# Patient Record
Sex: FEMALE | Race: WHITE | NOT HISPANIC OR LATINO | Employment: OTHER | URBAN - METROPOLITAN AREA
[De-identification: names, ages, dates, MRNs, and addresses within clinical notes are randomized per-mention and may not be internally consistent; named-entity substitution may affect disease eponyms.]

---

## 2017-02-17 ENCOUNTER — COMMUNICATION - HEALTHEAST (OUTPATIENT)
Dept: FAMILY MEDICINE | Facility: CLINIC | Age: 57
End: 2017-02-17

## 2017-02-17 DIAGNOSIS — K21.9 ESOPHAGEAL REFLUX: ICD-10-CM

## 2017-02-19 ENCOUNTER — COMMUNICATION - HEALTHEAST (OUTPATIENT)
Dept: FAMILY MEDICINE | Facility: CLINIC | Age: 57
End: 2017-02-19

## 2017-02-19 DIAGNOSIS — K21.9 ESOPHAGEAL REFLUX: ICD-10-CM

## 2017-03-21 ENCOUNTER — COMMUNICATION - HEALTHEAST (OUTPATIENT)
Dept: FAMILY MEDICINE | Facility: CLINIC | Age: 57
End: 2017-03-21

## 2017-03-22 ENCOUNTER — RECORDS - HEALTHEAST (OUTPATIENT)
Dept: ADMINISTRATIVE | Facility: OTHER | Age: 57
End: 2017-03-22

## 2017-05-20 ENCOUNTER — COMMUNICATION - HEALTHEAST (OUTPATIENT)
Dept: FAMILY MEDICINE | Facility: CLINIC | Age: 57
End: 2017-05-20

## 2017-05-20 DIAGNOSIS — K21.9 ESOPHAGEAL REFLUX: ICD-10-CM

## 2017-08-09 ENCOUNTER — RECORDS - HEALTHEAST (OUTPATIENT)
Dept: ADMINISTRATIVE | Facility: OTHER | Age: 57
End: 2017-08-09

## 2017-08-28 ENCOUNTER — COMMUNICATION - HEALTHEAST (OUTPATIENT)
Dept: FAMILY MEDICINE | Facility: CLINIC | Age: 57
End: 2017-08-28

## 2017-09-12 ENCOUNTER — HOSPITAL ENCOUNTER (OUTPATIENT)
Dept: ULTRASOUND IMAGING | Facility: HOSPITAL | Age: 57
Discharge: HOME | End: 2017-09-12

## 2017-09-12 DIAGNOSIS — E04.1 THYROID NODULE: ICD-10-CM

## 2017-10-24 ENCOUNTER — RECORDS - HEALTHEAST (OUTPATIENT)
Dept: ADMINISTRATIVE | Facility: OTHER | Age: 57
End: 2017-10-24

## 2018-01-10 ENCOUNTER — HOSPITAL ENCOUNTER (OUTPATIENT)
Dept: MAMMOGRAPHY | Facility: HOSPITAL | Age: 58
Discharge: HOME | End: 2018-01-10
Attending: OBSTETRICS & GYNECOLOGY

## 2018-01-10 DIAGNOSIS — Z12.31 VISIT FOR SCREENING MAMMOGRAM: ICD-10-CM

## 2018-03-11 ENCOUNTER — COMMUNICATION - HEALTHEAST (OUTPATIENT)
Dept: FAMILY MEDICINE | Facility: CLINIC | Age: 58
End: 2018-03-11

## 2018-03-11 DIAGNOSIS — K21.9 ESOPHAGEAL REFLUX: ICD-10-CM

## 2018-04-23 ENCOUNTER — RECORDS - HEALTHEAST (OUTPATIENT)
Dept: ADMINISTRATIVE | Facility: OTHER | Age: 58
End: 2018-04-23

## 2018-06-25 ENCOUNTER — COMMUNICATION - HEALTHEAST (OUTPATIENT)
Dept: FAMILY MEDICINE | Facility: CLINIC | Age: 58
End: 2018-06-25

## 2018-06-25 DIAGNOSIS — K21.9 ESOPHAGEAL REFLUX: ICD-10-CM

## 2018-08-21 ENCOUNTER — OFFICE VISIT - HEALTHEAST (OUTPATIENT)
Dept: FAMILY MEDICINE | Facility: CLINIC | Age: 58
End: 2018-08-21

## 2018-08-21 ENCOUNTER — RECORDS - HEALTHEAST (OUTPATIENT)
Dept: GENERAL RADIOLOGY | Facility: CLINIC | Age: 58
End: 2018-08-21
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
## 2018-08-21 DIAGNOSIS — R10.12 LUQ ABDOMINAL PAIN: ICD-10-CM

## 2018-08-21 DIAGNOSIS — Z00.00 HEALTH CARE MAINTENANCE: ICD-10-CM

## 2018-08-21 DIAGNOSIS — R06.83 SNORING: ICD-10-CM

## 2018-08-21 DIAGNOSIS — K21.9 GASTROESOPHAGEAL REFLUX DISEASE WITHOUT ESOPHAGITIS: ICD-10-CM

## 2018-08-21 ASSESSMENT — MIFFLIN-ST. JEOR: SCORE: 1356.76

## 2018-08-27 ENCOUNTER — AMBULATORY - HEALTHEAST (OUTPATIENT)
Dept: LAB | Facility: CLINIC | Age: 58
End: 2018-08-27

## 2018-08-27 DIAGNOSIS — Z00.00 HEALTH CARE MAINTENANCE: ICD-10-CM

## 2018-08-27 LAB
ALBUMIN SERPL-MCNC: 3.6 G/DL (ref 3.5–5)
ALP SERPL-CCNC: 68 U/L (ref 45–120)
ALT SERPL W P-5'-P-CCNC: 56 U/L (ref 0–45)
ALT SERPL-CCNC: 56 U/L (ref 0–45)
ANION GAP SERPL CALCULATED.3IONS-SCNC: 8 MMOL/L (ref 5–18)
AST SERPL W P-5'-P-CCNC: 50 U/L (ref 0–40)
AST SERPL-CCNC: 50 U/L (ref 0–40)
BILIRUB SERPL-MCNC: 0.6 MG/DL (ref 0–1)
BUN SERPL-MCNC: 13 MG/DL (ref 8–22)
C REACTIVE PROTEIN LHE: 0.7 MG/DL (ref 0–0.8)
CALCIUM SERPL-MCNC: 9.2 MG/DL (ref 8.5–10.5)
CHLORIDE BLD-SCNC: 105 MMOL/L (ref 98–107)
CHOLEST SERPL-MCNC: 250 MG/DL
CHOLEST SERPL-MCNC: 250 MG/DL
CO2 SERPL-SCNC: 28 MMOL/L (ref 22–31)
CREAT SERPL-MCNC: 0.73 MG/DL (ref 0.6–1.1)
CREAT SERPL-MCNC: 0.73 MG/DL (ref 0.6–1.1)
ERYTHROCYTE [DISTWIDTH] IN BLOOD BY AUTOMATED COUNT: 12 % (ref 11–14.5)
FASTING STATUS PATIENT QL REPORTED: YES
GFR SERPL CREATININE-BSD FRML MDRD: >60 ML/MIN/1.73M2
GFR SERPL CREATININE-BSD FRML MDRD: >60 ML/MIN/1.73M2
GLUCOSE BLD-MCNC: 103 MG/DL (ref 70–125)
GLUCOSE SERPL-MCNC: 103 MG/DL (ref 70–125)
HCT VFR BLD AUTO: 38.1 % (ref 35–47)
HDLC SERPL-MCNC: 44 MG/DL
HDLC SERPL-MCNC: 44 MG/DL
HGB BLD-MCNC: 13.1 G/DL (ref 12–16)
LDLC SERPL CALC-MCNC: 175 MG/DL
LDLC SERPL CALC-MCNC: 175 MG/DL
MCH RBC QN AUTO: 30.8 PG (ref 27–34)
MCHC RBC AUTO-ENTMCNC: 34.5 G/DL (ref 32–36)
MCV RBC AUTO: 89 FL (ref 80–100)
PLATELET # BLD AUTO: 184 THOU/UL (ref 140–440)
PMV BLD AUTO: 6.4 FL (ref 7–10)
POTASSIUM BLD-SCNC: 4.5 MMOL/L (ref 3.5–5)
POTASSIUM SERPL-SCNC: 4.5 MMOL/L (ref 3.5–5)
PROT SERPL-MCNC: 7.1 G/DL (ref 6–8)
RBC # BLD AUTO: 4.27 MILL/UL (ref 3.8–5.4)
SODIUM SERPL-SCNC: 141 MMOL/L (ref 136–145)
TRIGL SERPL-MCNC: 157 MG/DL
TRIGL SERPL-MCNC: 157 MG/DL
WBC: 5.6 THOU/UL (ref 4–11)

## 2018-10-09 ENCOUNTER — RECORDS - HEALTHEAST (OUTPATIENT)
Dept: ADMINISTRATIVE | Facility: OTHER | Age: 58
End: 2018-10-09

## 2018-10-25 ENCOUNTER — TRANSFERRED RECORDS (OUTPATIENT)
Dept: HEALTH INFORMATION MANAGEMENT | Facility: CLINIC | Age: 58
End: 2018-10-25

## 2018-10-25 ENCOUNTER — AMBULATORY - HEALTHEAST (OUTPATIENT)
Dept: SLEEP MEDICINE | Facility: CLINIC | Age: 58
End: 2018-10-25

## 2018-10-25 ENCOUNTER — OFFICE VISIT - HEALTHEAST (OUTPATIENT)
Dept: SLEEP MEDICINE | Facility: CLINIC | Age: 58
End: 2018-10-25

## 2018-10-25 DIAGNOSIS — G47.00 PERSISTENT INSOMNIA: ICD-10-CM

## 2018-10-25 DIAGNOSIS — Z91.89 AT RISK FOR SLEEP APNEA: ICD-10-CM

## 2018-10-25 DIAGNOSIS — G47.10 EXCESSIVE SLEEPINESS: ICD-10-CM

## 2018-10-25 DIAGNOSIS — R06.83 SNORING: ICD-10-CM

## 2018-10-25 ASSESSMENT — MIFFLIN-ST. JEOR: SCORE: 1359.77

## 2018-11-06 ENCOUNTER — RECORDS - HEALTHEAST (OUTPATIENT)
Dept: ADMINISTRATIVE | Facility: OTHER | Age: 58
End: 2018-11-06

## 2018-11-19 ENCOUNTER — TELEPHONE (OUTPATIENT)
Dept: SLEEP MEDICINE | Facility: CLINIC | Age: 58
End: 2018-11-19

## 2018-11-19 ENCOUNTER — THERAPY VISIT (OUTPATIENT)
Dept: SLEEP MEDICINE | Facility: CLINIC | Age: 58
End: 2018-11-19
Payer: COMMERCIAL

## 2018-11-19 ENCOUNTER — RECORDS - HEALTHEAST (OUTPATIENT)
Dept: ADMINISTRATIVE | Facility: OTHER | Age: 58
End: 2018-11-19

## 2018-11-19 DIAGNOSIS — R06.83 SNORING: Primary | ICD-10-CM

## 2018-11-19 DIAGNOSIS — R06.81 APNEA: ICD-10-CM

## 2018-11-19 DIAGNOSIS — R06.83 SNORING: ICD-10-CM

## 2018-11-19 PROCEDURE — 95810 POLYSOM 6/> YRS 4/> PARAM: CPT | Performed by: FAMILY MEDICINE

## 2018-11-19 NOTE — MR AVS SNAPSHOT
"              After Visit Summary   2018    Pretty Vigil    MRN: [id number]           Patient Information     Date Of Birth          1960        Visit Information        Provider Department      2018 8:00 PM SLEEP LAB, BED TWO [facility]        Today's Diagnoses     Snoring        Apnea           Follow-ups after your visit        Who to contact     If you have questions or need follow up information about today's clinic visit or your schedule please contact [facility] directly at [phone].  Normal or non-critical lab and imaging results will be communicated to you by MyChart, letter or phone within 4 business days after the clinic has received the results. If you do not hear from us within 7 days, please contact the clinic through Picodeonhart or phone. If you have a critical or abnormal lab result, we will notify you by phone as soon as possible.  Submit refill requests through Spice[employer]M or call your pharmacy and they will forward the refill request to us. Please allow 3 business days for your refill to be completed.          Additional Information About Your Visit        MyChart Information     SpiceCSM lets you send messages to your doctor, view your test results, renew your prescriptions, schedule appointments and more. To sign up, go to [email or web address] . Click on \"Log in\" on the left side of the screen, which will take you to the Welcome page. Then click on \"Sign up Now\" on the right side of the page.     You will be asked to enter the access code listed below, as well as some personal information. Please follow the directions to create your username and password.     Your access code is: [email or web address]  Expires: 2019  5:13 AM     Your access code will  in 90 days. If you need help or a new code, please call your [facility] or [phone].        Care EveryWhere ID     This is your Care EveryWhere ID. This could be used by other " organizations to access your [place] medical records  [id number]         Blood Pressure from Last 3 Encounters:   No data found for BP    Weight from Last 3 Encounters:   No data found for Wt              We Performed the Following     Comprehensive Sleep Study        Primary Care Provider Office Phone # Fax #    Wendi Murillo [phone]       [facility] [address] [facility] [address]        Equal Access to Services     REYMUNDO LI : Hadii aad ku hadasho Soomaali, waaxda luqadaha, qaybta kaalmada adeegyada, waxay idiin hayaan adeeg kharash laalicia . So [facility] [phone].    ATENCIÓN: Si habla español, tiene a alvarez disposición servicios gratuitos de asistencia lingüística. Llame al [phone].    We comply with applicable federal civil rights laws and [place] laws. We do not discriminate on the basis of race, color, national origin, age, disability, sex, sexual orientation, or gender identity.            Thank you!     Thank you for choosing [facility]  for your care. Our goal is always to provide you with excellent care. Hearing back from our patients is one way we can continue to improve our services. Please take a few minutes to complete the written survey that you may receive in the mail after your visit with us. Thank you!             Your Updated Medication List - Protect others around you: Learn how to safely use, store and throw away your medicines at www.disposemymeds.org.      Notice  As of 11/19/2018 11:59 PM    You have not been prescribed any medications.

## 2018-11-19 NOTE — TELEPHONE ENCOUNTER
Routine split study ordered by Dr. Alves via fax. Can you sign an order in EPIC so the techs have something to release and link to our records. Thank you.

## 2018-11-26 LAB — SLPCOMP: NORMAL

## 2018-12-01 ENCOUNTER — HEALTH MAINTENANCE LETTER (OUTPATIENT)
Age: 58
End: 2018-12-01

## 2018-12-07 ENCOUNTER — OFFICE VISIT - HEALTHEAST (OUTPATIENT)
Dept: SLEEP MEDICINE | Facility: CLINIC | Age: 58
End: 2018-12-07

## 2018-12-07 DIAGNOSIS — R06.83 SNORING: ICD-10-CM

## 2018-12-07 DIAGNOSIS — G47.00 PERSISTENT INSOMNIA: ICD-10-CM

## 2018-12-07 ASSESSMENT — MIFFLIN-ST. JEOR: SCORE: 1420.56

## 2019-02-07 ENCOUNTER — COMMUNICATION - HEALTHEAST (OUTPATIENT)
Dept: FAMILY MEDICINE | Facility: CLINIC | Age: 59
End: 2019-02-07

## 2019-02-07 DIAGNOSIS — K21.9 ESOPHAGEAL REFLUX: ICD-10-CM

## 2019-03-05 ENCOUNTER — COMMUNICATION - HEALTHEAST (OUTPATIENT)
Dept: FAMILY MEDICINE | Facility: CLINIC | Age: 59
End: 2019-03-05

## 2019-03-05 DIAGNOSIS — K21.9 ESOPHAGEAL REFLUX: ICD-10-CM

## 2019-03-06 ENCOUNTER — HOSPITAL ENCOUNTER (OUTPATIENT)
Dept: MAMMOGRAPHY | Facility: CLINIC | Age: 59
Discharge: HOME | End: 2019-03-06
Attending: FAMILY MEDICINE

## 2019-03-06 DIAGNOSIS — Z12.31 VISIT FOR SCREENING MAMMOGRAM: ICD-10-CM

## 2019-08-07 ENCOUNTER — COMMUNICATION - HEALTHEAST (OUTPATIENT)
Dept: FAMILY MEDICINE | Facility: CLINIC | Age: 59
End: 2019-08-07

## 2019-08-07 ENCOUNTER — OFFICE VISIT - HEALTHEAST (OUTPATIENT)
Dept: FAMILY MEDICINE | Facility: CLINIC | Age: 59
End: 2019-08-07

## 2019-08-07 DIAGNOSIS — K21.9 ESOPHAGEAL REFLUX: ICD-10-CM

## 2019-08-07 DIAGNOSIS — Z00.00 HEALTH CARE MAINTENANCE: ICD-10-CM

## 2019-08-07 DIAGNOSIS — R00.2 PALPITATIONS: ICD-10-CM

## 2019-08-07 DIAGNOSIS — L30.9 DERMATITIS: ICD-10-CM

## 2019-08-07 RX ORDER — ZOLEDRONIC ACID 5 MG/100ML
5 INJECTION, SOLUTION INTRAVENOUS
Status: SHIPPED | COMMUNITY
Start: 2019-08-07 | End: 2021-12-06

## 2019-08-09 ENCOUNTER — AMBULATORY - HEALTHEAST (OUTPATIENT)
Dept: LAB | Facility: CLINIC | Age: 59
End: 2019-08-09

## 2019-08-09 DIAGNOSIS — Z00.00 HEALTH CARE MAINTENANCE: ICD-10-CM

## 2019-08-09 LAB
ALBUMIN SERPL-MCNC: 4 G/DL (ref 3.5–5)
ALP SERPL-CCNC: 85 U/L (ref 45–120)
ALT SERPL W P-5'-P-CCNC: 41 U/L (ref 0–45)
ANION GAP SERPL CALCULATED.3IONS-SCNC: 10 MMOL/L (ref 5–18)
AST SERPL W P-5'-P-CCNC: 37 U/L (ref 0–40)
BILIRUB SERPL-MCNC: 0.5 MG/DL (ref 0–1)
BUN SERPL-MCNC: 15 MG/DL (ref 8–22)
CALCIUM SERPL-MCNC: 10 MG/DL (ref 8.5–10.5)
CHLORIDE BLD-SCNC: 102 MMOL/L (ref 98–107)
CHOLEST SERPL-MCNC: 270 MG/DL
CO2 SERPL-SCNC: 27 MMOL/L (ref 22–31)
CREAT SERPL-MCNC: 0.81 MG/DL (ref 0.6–1.1)
ERYTHROCYTE [DISTWIDTH] IN BLOOD BY AUTOMATED COUNT: 12.1 % (ref 11–14.5)
FASTING STATUS PATIENT QL REPORTED: YES
GFR SERPL CREATININE-BSD FRML MDRD: >60 ML/MIN/1.73M2
GLUCOSE BLD-MCNC: 98 MG/DL (ref 70–125)
HCT VFR BLD AUTO: 40.5 % (ref 35–47)
HDLC SERPL-MCNC: 57 MG/DL
HGB BLD-MCNC: 13.6 G/DL (ref 12–16)
LDLC SERPL CALC-MCNC: 188 MG/DL
MCH RBC QN AUTO: 30.9 PG (ref 27–34)
MCHC RBC AUTO-ENTMCNC: 33.5 G/DL (ref 32–36)
MCV RBC AUTO: 92 FL (ref 80–100)
PLATELET # BLD AUTO: 232 THOU/UL (ref 140–440)
PMV BLD AUTO: 7 FL (ref 7–10)
POTASSIUM BLD-SCNC: 4.2 MMOL/L (ref 3.5–5)
PROT SERPL-MCNC: 7.3 G/DL (ref 6–8)
RBC # BLD AUTO: 4.4 MILL/UL (ref 3.8–5.4)
SODIUM SERPL-SCNC: 139 MMOL/L (ref 136–145)
TRIGL SERPL-MCNC: 124 MG/DL
WBC: 6.5 THOU/UL (ref 4–11)

## 2019-08-13 ENCOUNTER — COMMUNICATION - HEALTHEAST (OUTPATIENT)
Dept: FAMILY MEDICINE | Facility: CLINIC | Age: 59
End: 2019-08-13

## 2019-08-13 DIAGNOSIS — K21.9 ESOPHAGEAL REFLUX: ICD-10-CM

## 2019-08-15 ENCOUNTER — AMBULATORY - HEALTHEAST (OUTPATIENT)
Dept: NURSING | Facility: CLINIC | Age: 59
End: 2019-08-15

## 2019-09-06 ENCOUNTER — COMMUNICATION - HEALTHEAST (OUTPATIENT)
Dept: FAMILY MEDICINE | Facility: CLINIC | Age: 59
End: 2019-09-06

## 2019-09-06 DIAGNOSIS — K21.9 ESOPHAGEAL REFLUX: ICD-10-CM

## 2019-09-25 ENCOUNTER — AMBULATORY - HEALTHEAST (OUTPATIENT)
Dept: SCHEDULING | Facility: CLINIC | Age: 59
End: 2019-09-25

## 2019-09-25 ENCOUNTER — RECORDS - HEALTHEAST (OUTPATIENT)
Dept: ADMINISTRATIVE | Facility: OTHER | Age: 59
End: 2019-09-25

## 2019-09-25 DIAGNOSIS — M81.0 OSTEOPOROSIS, POSTMENOPAUSAL: ICD-10-CM

## 2019-09-29 ENCOUNTER — HEALTH MAINTENANCE LETTER (OUTPATIENT)
Age: 59
End: 2019-09-29

## 2019-10-03 ENCOUNTER — AMBULATORY - HEALTHEAST (OUTPATIENT)
Dept: FAMILY MEDICINE | Facility: CLINIC | Age: 59
End: 2019-10-03

## 2019-10-03 ENCOUNTER — COMMUNICATION - HEALTHEAST (OUTPATIENT)
Dept: FAMILY MEDICINE | Facility: CLINIC | Age: 59
End: 2019-10-03

## 2019-10-03 DIAGNOSIS — K21.9 GASTROESOPHAGEAL REFLUX DISEASE, ESOPHAGITIS PRESENCE NOT SPECIFIED: ICD-10-CM

## 2019-10-07 ENCOUNTER — COMMUNICATION - HEALTHEAST (OUTPATIENT)
Dept: FAMILY MEDICINE | Facility: CLINIC | Age: 59
End: 2019-10-07

## 2019-10-07 ENCOUNTER — AMBULATORY - HEALTHEAST (OUTPATIENT)
Dept: FAMILY MEDICINE | Facility: CLINIC | Age: 59
End: 2019-10-07

## 2019-10-07 DIAGNOSIS — K21.9 ESOPHAGEAL REFLUX: ICD-10-CM

## 2019-10-12 ENCOUNTER — COMMUNICATION - HEALTHEAST (OUTPATIENT)
Dept: FAMILY MEDICINE | Facility: CLINIC | Age: 59
End: 2019-10-12

## 2019-10-12 DIAGNOSIS — L30.9 DERMATITIS: ICD-10-CM

## 2019-10-14 RX ORDER — TRIAMCINOLONE ACETONIDE 1 MG/G
CREAM TOPICAL
Qty: 15 G | Refills: 0 | Status: SHIPPED | OUTPATIENT
Start: 2019-10-14 | End: 2023-04-28

## 2019-10-29 ENCOUNTER — AMBULATORY - HEALTHEAST (OUTPATIENT)
Dept: NURSING | Facility: CLINIC | Age: 59
End: 2019-10-29

## 2019-11-07 ENCOUNTER — COMMUNICATION - HEALTHEAST (OUTPATIENT)
Dept: FAMILY MEDICINE | Facility: CLINIC | Age: 59
End: 2019-11-07

## 2019-11-07 ENCOUNTER — AMBULATORY - HEALTHEAST (OUTPATIENT)
Dept: FAMILY MEDICINE | Facility: CLINIC | Age: 59
End: 2019-11-07

## 2019-11-07 DIAGNOSIS — M85.80 OSTEOPENIA, UNSPECIFIED LOCATION: ICD-10-CM

## 2019-11-13 ENCOUNTER — AMBULATORY - HEALTHEAST (OUTPATIENT)
Dept: LAB | Facility: CLINIC | Age: 59
End: 2019-11-13

## 2019-11-13 DIAGNOSIS — M85.80 OSTEOPENIA, UNSPECIFIED LOCATION: ICD-10-CM

## 2019-11-14 ENCOUNTER — RECORDS - HEALTHEAST (OUTPATIENT)
Dept: ADMINISTRATIVE | Facility: OTHER | Age: 59
End: 2019-11-14

## 2019-11-14 LAB
25(OH)D3 SERPL-MCNC: 34 NG/ML (ref 30–80)
25(OH)D3 SERPL-MCNC: 34 NG/ML (ref 30–80)

## 2019-12-31 ENCOUNTER — COMMUNICATION - HEALTHEAST (OUTPATIENT)
Dept: FAMILY MEDICINE | Facility: CLINIC | Age: 59
End: 2019-12-31

## 2019-12-31 DIAGNOSIS — K21.9 GASTROESOPHAGEAL REFLUX DISEASE, ESOPHAGITIS PRESENCE NOT SPECIFIED: ICD-10-CM

## 2020-01-03 ENCOUNTER — AMBULATORY - HEALTHEAST (OUTPATIENT)
Dept: FAMILY MEDICINE | Facility: CLINIC | Age: 60
End: 2020-01-03

## 2020-01-03 DIAGNOSIS — K21.9 GASTROESOPHAGEAL REFLUX DISEASE, ESOPHAGITIS PRESENCE NOT SPECIFIED: ICD-10-CM

## 2020-02-14 ENCOUNTER — COMMUNICATION - HEALTHEAST (OUTPATIENT)
Dept: FAMILY MEDICINE | Facility: CLINIC | Age: 60
End: 2020-02-14

## 2020-02-14 DIAGNOSIS — K21.9 GASTROESOPHAGEAL REFLUX DISEASE, ESOPHAGITIS PRESENCE NOT SPECIFIED: ICD-10-CM

## 2020-06-03 ENCOUNTER — HOSPITAL ENCOUNTER (OUTPATIENT)
Dept: MAMMOGRAPHY | Facility: CLINIC | Age: 60
Discharge: HOME | End: 2020-06-03
Attending: FAMILY MEDICINE

## 2020-06-03 DIAGNOSIS — Z12.31 VISIT FOR SCREENING MAMMOGRAM: ICD-10-CM

## 2020-06-30 ENCOUNTER — COMMUNICATION - HEALTHEAST (OUTPATIENT)
Dept: FAMILY MEDICINE | Facility: CLINIC | Age: 60
End: 2020-06-30

## 2020-06-30 DIAGNOSIS — K21.9 GASTROESOPHAGEAL REFLUX DISEASE, ESOPHAGITIS PRESENCE NOT SPECIFIED: ICD-10-CM

## 2020-09-18 ENCOUNTER — COMMUNICATION - HEALTHEAST (OUTPATIENT)
Dept: FAMILY MEDICINE | Facility: CLINIC | Age: 60
End: 2020-09-18

## 2020-09-18 DIAGNOSIS — K21.9 GASTROESOPHAGEAL REFLUX DISEASE, ESOPHAGITIS PRESENCE NOT SPECIFIED: ICD-10-CM

## 2020-10-30 ENCOUNTER — COMMUNICATION - HEALTHEAST (OUTPATIENT)
Dept: FAMILY MEDICINE | Facility: CLINIC | Age: 60
End: 2020-10-30

## 2020-10-30 ENCOUNTER — RECORDS - HEALTHEAST (OUTPATIENT)
Dept: ENDOCRINOLOGY | Facility: CLINIC | Age: 60
End: 2020-10-30

## 2020-10-30 DIAGNOSIS — Z83.49 FAMILY HISTORY OF HEMOCHROMATOSIS: ICD-10-CM

## 2020-10-30 DIAGNOSIS — Z78.0 POSTMENOPAUSAL: ICD-10-CM

## 2020-10-30 DIAGNOSIS — M81.0 AGE-RELATED OSTEOPOROSIS WITHOUT CURRENT PATHOLOGICAL FRACTURE: ICD-10-CM

## 2020-11-02 ENCOUNTER — RECORDS - HEALTHEAST (OUTPATIENT)
Dept: ADMINISTRATIVE | Facility: OTHER | Age: 60
End: 2020-11-02

## 2020-11-03 ENCOUNTER — AMBULATORY - HEALTHEAST (OUTPATIENT)
Dept: LAB | Facility: CLINIC | Age: 60
End: 2020-11-03

## 2020-11-03 DIAGNOSIS — Z83.49 FAMILY HISTORY OF HEMOCHROMATOSIS: ICD-10-CM

## 2020-11-05 LAB — MOLECULAR DX INHERIT DISORDER - HISTORICAL: NORMAL

## 2020-12-23 ENCOUNTER — COMMUNICATION - HEALTHEAST (OUTPATIENT)
Dept: FAMILY MEDICINE | Facility: CLINIC | Age: 60
End: 2020-12-23

## 2020-12-23 DIAGNOSIS — K21.9 GASTROESOPHAGEAL REFLUX DISEASE: ICD-10-CM

## 2021-01-07 ENCOUNTER — OFFICE VISIT - HEALTHEAST (OUTPATIENT)
Dept: FAMILY MEDICINE | Facility: CLINIC | Age: 61
End: 2021-01-07

## 2021-01-07 DIAGNOSIS — K21.00 GASTROESOPHAGEAL REFLUX DISEASE WITH ESOPHAGITIS, UNSPECIFIED WHETHER HEMORRHAGE: ICD-10-CM

## 2021-01-07 DIAGNOSIS — R03.0 ELEVATED BLOOD PRESSURE READING WITHOUT DIAGNOSIS OF HYPERTENSION: ICD-10-CM

## 2021-01-07 DIAGNOSIS — Z00.00 ROUTINE HISTORY AND PHYSICAL EXAMINATION OF ADULT: ICD-10-CM

## 2021-01-07 LAB
ALBUMIN SERPL-MCNC: 4.2 G/DL (ref 3.5–5)
ALP SERPL-CCNC: 87 U/L (ref 45–120)
ALT SERPL W P-5'-P-CCNC: 31 U/L (ref 0–45)
ANION GAP SERPL CALCULATED.3IONS-SCNC: 13 MMOL/L (ref 5–18)
AST SERPL W P-5'-P-CCNC: 26 U/L (ref 0–40)
BILIRUB SERPL-MCNC: 0.4 MG/DL (ref 0–1)
BUN SERPL-MCNC: 17 MG/DL (ref 8–22)
CALCIUM SERPL-MCNC: 10.2 MG/DL (ref 8.5–10.5)
CHLORIDE BLD-SCNC: 102 MMOL/L (ref 98–107)
CHOLEST SERPL-MCNC: 289 MG/DL
CO2 SERPL-SCNC: 26 MMOL/L (ref 22–31)
CREAT SERPL-MCNC: 0.77 MG/DL (ref 0.6–1.1)
ERYTHROCYTE [DISTWIDTH] IN BLOOD BY AUTOMATED COUNT: 12.4 % (ref 11–14.5)
FASTING STATUS PATIENT QL REPORTED: YES
GFR SERPL CREATININE-BSD FRML MDRD: >60 ML/MIN/1.73M2
GLUCOSE BLD-MCNC: 94 MG/DL (ref 70–125)
HCT VFR BLD AUTO: 43.4 % (ref 35–47)
HDLC SERPL-MCNC: 63 MG/DL
HGB BLD-MCNC: 14.4 G/DL (ref 12–16)
LDLC SERPL CALC-MCNC: 204 MG/DL
MCH RBC QN AUTO: 30.4 PG (ref 27–34)
MCHC RBC AUTO-ENTMCNC: 33.1 G/DL (ref 32–36)
MCV RBC AUTO: 92 FL (ref 80–100)
PLATELET # BLD AUTO: 242 THOU/UL (ref 140–440)
PMV BLD AUTO: 6.9 FL (ref 7–10)
POTASSIUM BLD-SCNC: 4.2 MMOL/L (ref 3.5–5)
PROT SERPL-MCNC: 7.6 G/DL (ref 6–8)
RBC # BLD AUTO: 4.72 MILL/UL (ref 3.8–5.4)
SODIUM SERPL-SCNC: 141 MMOL/L (ref 136–145)
TRIGL SERPL-MCNC: 111 MG/DL
WBC: 6.8 THOU/UL (ref 4–11)

## 2021-01-07 ASSESSMENT — MIFFLIN-ST. JEOR: SCORE: 1368.51

## 2021-01-08 LAB
25(OH)D3 SERPL-MCNC: 39.1 NG/ML (ref 30–80)
25(OH)D3 SERPL-MCNC: 39.1 NG/ML (ref 30–80)

## 2021-01-13 ENCOUNTER — AMBULATORY - HEALTHEAST (OUTPATIENT)
Dept: NURSING | Facility: CLINIC | Age: 61
End: 2021-01-13

## 2021-01-13 DIAGNOSIS — I10 ESSENTIAL HYPERTENSION, BENIGN: ICD-10-CM

## 2021-01-15 ENCOUNTER — HEALTH MAINTENANCE LETTER (OUTPATIENT)
Age: 61
End: 2021-01-15

## 2021-02-08 ENCOUNTER — COMMUNICATION - HEALTHEAST (OUTPATIENT)
Dept: ADMINISTRATIVE | Facility: CLINIC | Age: 61
End: 2021-02-08

## 2021-02-09 ENCOUNTER — OFFICE VISIT - HEALTHEAST (OUTPATIENT)
Dept: FAMILY MEDICINE | Facility: CLINIC | Age: 61
End: 2021-02-09

## 2021-02-09 DIAGNOSIS — I10 ESSENTIAL HYPERTENSION, BENIGN: ICD-10-CM

## 2021-02-09 DIAGNOSIS — E78.2 MIXED HYPERLIPIDEMIA: ICD-10-CM

## 2021-03-15 ENCOUNTER — COMMUNICATION - HEALTHEAST (OUTPATIENT)
Dept: FAMILY MEDICINE | Facility: CLINIC | Age: 61
End: 2021-03-15

## 2021-03-15 DIAGNOSIS — K21.9 GASTROESOPHAGEAL REFLUX DISEASE: ICD-10-CM

## 2021-03-15 DIAGNOSIS — I10 ESSENTIAL HYPERTENSION, BENIGN: ICD-10-CM

## 2021-03-17 RX ORDER — LISINOPRIL 10 MG/1
TABLET ORAL
Qty: 90 TABLET | Refills: 3 | Status: SHIPPED | OUTPATIENT
Start: 2021-03-17 | End: 2022-03-02

## 2021-03-17 RX ORDER — MECOBALAMIN 5000 MCG
TABLET,DISINTEGRATING ORAL
Qty: 180 CAPSULE | Refills: 3 | Status: SHIPPED | OUTPATIENT
Start: 2021-03-17 | End: 2022-03-02

## 2021-05-21 ENCOUNTER — RECORDS - HEALTHEAST (OUTPATIENT)
Dept: ADMINISTRATIVE | Facility: OTHER | Age: 61
End: 2021-05-21

## 2021-05-25 ENCOUNTER — RECORDS - HEALTHEAST (OUTPATIENT)
Dept: ADMINISTRATIVE | Facility: CLINIC | Age: 61
End: 2021-05-25

## 2021-05-27 VITALS — SYSTOLIC BLOOD PRESSURE: 148 MMHG | DIASTOLIC BLOOD PRESSURE: 75 MMHG | HEART RATE: 91 BPM

## 2021-05-31 ENCOUNTER — RECORDS - HEALTHEAST (OUTPATIENT)
Dept: ADMINISTRATIVE | Facility: CLINIC | Age: 61
End: 2021-05-31

## 2021-05-31 NOTE — TELEPHONE ENCOUNTER
We received some vaccine in so patient scheduled a nurse only appt for 08/15/19 at 89:40AM. Please place order if appropriate.     Thank you!

## 2021-05-31 NOTE — TELEPHONE ENCOUNTER
Informed patient that we are currently experiencing a shortage on the shingles vaccine. We will put her on the wait list and call her when we have the vaccine available.

## 2021-05-31 NOTE — PROGRESS NOTES
ASSESSMENT/PLAN  1. Esophageal reflux  With taking the combination of these medications patient has her acid reflux under good control  If she misses dosing she usually has a flareup within a day  Patient will continue with current medication refill sent to the pharmacy  - lansoprazole (PREVACID) 15 MG capsule; TAKE ONE CAPSULE BY MOUTH ONCE DAILY BEFORE A MEAL  Dispense: 90 capsule; Refill: 1  - ranitidine (ZANTAC) 150 MG tablet; Take 1 tablet (150 mg total) by mouth at bedtime.  Dispense: 90 tablet; Refill: 1    2. Dermatitis  Patient periodically uses triamcinolone on areas of skin irritation  Refill sent to the pharmacy  - triamcinolone (KENALOG) 0.1 % cream; APPLY EXTERNALLY TO THE AFFECTED AREA TWICE DAILY AS NEEDED  Dispense: 15 g; Refill: 0    3. Health care maintenance  Patient is due for fasting labs but she is not fasting today  She will return for fasting labs in the near future  Patient has been working out on a more regular basis with a   She is working on weight loss we discussed lowering carbohydrate intake in the diet  - Comprehensive Metabolic Panel; Future  - Lipid [place] FASTING; Future  - HM2(CBC w/o Differential); Future    4. Palpitations  Patient has noted the last few months of periodically when walking she gets a slight palpitation which is only a second or 2 with and if this continues  It is no longer in duration and has no other associated symptoms  Discussed monitoring at this time        SUBJECTIVE:   Chief Complaint   Patient presents with     Gastroesophageal Reflux     Medication check      Immunizations     Discuss shingles vaccine      Shortness of Breath     At times has trouble catching her breath      Medication Management     Not fasting today      Pretty A Musa 58 y.o. female    Current Outpatient Medications   Medication Sig Dispense Refill     CHOLECALCIFEROL, VITAMIN D3, (VITAMIN D3 ORAL) Take 2,000 Units by mouth daily.              Lactobacillus rhamnosus GG  (CULTURELLE) 15 billion cell CpSP Take 1 capsule by mouth 3 (three) times a day with meals.       OMEGA-3/DHA/EPA/FISH OIL (FISH OIL-OMEGA-3 FATTY ACIDS) 300-1,000 mg capsule Take 2 g by mouth daily.       vitamin E 100 UNIT capsule Take 100 Units by mouth daily.       zoledronic acid-mannitol-water (RECLAST) 5 mg/100 mL PgBk Infuse 5 mg into a venous catheter. Once yearly       lansoprazole (PREVACID) 15 MG capsule TAKE ONE CAPSULE BY MOUTH ONCE DAILY BEFORE A MEAL 90 capsule 1     ranitidine (ZANTAC) 150 MG tablet Take 1 tablet (150 mg total) by mouth at bedtime. 90 tablet 1     triamcinolone (KENALOG) 0.1 % cream APPLY EXTERNALLY TO THE AFFECTED AREA TWICE DAILY AS NEEDED 15 g 0     Current Facility-Administered Medications   Medication Dose Route Frequency Provider Last Rate Last Dose     denosumab 60 mg (PROLIA 60 mg/ml)  60 mg Subcutaneous Q6 Months Jillian Cardona MD   60 mg at 10/08/15 1149     Allergies: Patient has no known allergies.   No LMP recorded. Patient is postmenopausal.    HPI:   Patient is here for medication refills  Patient takes Prevacid and ranitidine for acid reflux control  If she misses dosing of the medication usually within a day or 2 she will have a flareup of symptoms  We will have her continue with current medications  Patient has been noting some palpitations infrequently in the recent past  The last only for matter of seconds and are associated with no other symptoms  Discussed with the patient that if these become more prolonged or more frequent or associated with other symptoms we need to further evaluate likely with a Holter monitor    Discussed the new shingles vaccine which patient will check with insurance    Patient uses a topical triamcinolone for periodic dermatitis areas    Patient is due for fasting blood work and return for fasting labs in the near future    ROS: negative except as per HPI    OBJECTIVE:   The patient appears well, alert, oriented x 3, in no  distress.  /61 (Patient Site: Left Arm, Patient Position: Sitting, Cuff Size: Adult Large)   Pulse 78   Temp 97.7  F (36.5  C) (Oral)   Resp 16   Wt 177 lb 6.4 oz (80.5 kg)   BMI 27.99 kg/m      Lungs: clear, good air entry, no wheezes, rhonchi or rales.   Cardiac: S1 and S2 normal, no murmurs, regular rate and rhythm.   Abdomen: normal bowel sounds, soft   Neurological: normal, no focal findings.  Skin: clear, dry, no rashes/lesions  Psych- normal mood and affect      Pt states an understanding and agrees to the above plan.

## 2021-05-31 NOTE — TELEPHONE ENCOUNTER
New Appointment Needed  What is the reason for the visit:    Shingles vaccine  Provider Preference: Any available  How soon do you need to be seen?: when available  Waitlist offered?: No  Okay to leave a detailed message:  Yes

## 2021-06-01 ENCOUNTER — RECORDS - HEALTHEAST (OUTPATIENT)
Dept: ADMINISTRATIVE | Facility: CLINIC | Age: 61
End: 2021-06-01

## 2021-06-01 VITALS — WEIGHT: 169.4 LBS | HEIGHT: 67 IN | BODY MASS INDEX: 26.59 KG/M2

## 2021-06-02 VITALS — HEIGHT: 67 IN | BODY MASS INDEX: 26.68 KG/M2 | WEIGHT: 170 LBS

## 2021-06-02 VITALS — BODY MASS INDEX: 28.79 KG/M2 | WEIGHT: 183.4 LBS | HEIGHT: 67 IN

## 2021-06-02 NOTE — TELEPHONE ENCOUNTER
Medication Question or Clarification  Who is calling: Patient  What medication are you calling about? (include dose and sig)   ranitidine (ZANTAC) 150 MG tablet 90 tablet 1 8/7/2019     Sig - Route: Take 1 tablet (150 mg total) by mouth at bedtime. - Oral    Sent to pharmacy as: ranitidine (ZANTAC) 150 MG tablet        Who prescribed the medication?: Dr Nichols  What is your question/concern?: Patient would like an alternative for this medication due to TV states it is not safe to take.  [facility] to leave a detailed message?: No  Site CMT - Please call the pharmacy to obtain any additional needed information.

## 2021-06-02 NOTE — TELEPHONE ENCOUNTER
Reason contacted:  Rx request  Information relayed:  Relayed message below from .   Additional questions:  No  Further follow-up needed:  No  Okay to leave a detailed message:  No

## 2021-06-02 NOTE — TELEPHONE ENCOUNTER
RN cannot approve Refill Request    RN can NOT refill this medication med is not covered by policy/route to provider. Last office visit: 8/7/2019 Rohan Nichols MD Last Physical: Visit date not found Last MTM visit: Visit date not found Last visit same specialty: 8/7/2019 Rohan Nichols MD.  Next visit within 3 mo: Visit date not found  Next physical within 3 mo: Visit date not found      Zee Sanchez, [facility] Triage/Med Refill 10/12/2019    Requested Prescriptions   Pending Prescriptions Disp Refills     triamcinolone (KENALOG) 0.1 % cream [Pharmacy Med Name: TRIAMCINOLONE 0.1% CREAM 15GM] 15 g 0     Sig: APPLY EXTERNALLY TO THE AFFECTED AREA TWICE DAILY AS NEEDED       There is no refill protocol information for this order

## 2021-06-02 NOTE — TELEPHONE ENCOUNTER
Medication Question or Clarification  Who is calling: Patient  What medication are you calling about? (include dose and sig)   cimetidine (TAGAMET) 200 MG tablet 60 tablet 0 10/3/2019     Sig - Route: Take 1 tablet (200 mg total) by mouth 2 (two) times a day. - Oral    Sent to pharmacy as: cimetidine 200 mg tablet (TAGAMET)        Who prescribed the medication?: Rohan Nichols MD    What is your question/concern?: I would like to talk to Rohan Nichols MD or his nurse directly regarding this medication as it is not working and I do not want to continue with this. Please return my call to discuss.  Pharmacy: [facility]  Okay to leave a detailed message?: Yes  Site CMT - Please call the pharmacy to obtain any additional needed information.

## 2021-06-02 NOTE — TELEPHONE ENCOUNTER
Called and spoke to patient. Informed her of message from provider. She is wondering  1. If she should take the omeprazole and lansoprazole?  2. If she should just increase the lansoprazole?    She states she will do whichever pcp thinks is best. She forgot to mention the first call that she takes lansoprazole once daily.

## 2021-06-02 NOTE — TELEPHONE ENCOUNTER
Patient states the Tagamet is not working for her. She is wondering if she can try something else. She would like a quantity of 10 sent to her pharmacy so she can try it first. She will let us know then if it is working for her and then we can send a full supply. She states her Zantac was 150mg and the Tagamet is 200mg and she doesn't want to increase the mg. I explained to her that the mg might be an equivalent dose and you can't really go by the mg alone.   Patient asks to notify her via Our[employer] when med is ready at pharmacy. ([facility] in [place]).

## 2021-06-02 NOTE — TELEPHONE ENCOUNTER
Another alternative is to go to omeprazole- this is a proton pump inhibitor- take once daily.  If she would like to try this I will send a 30 day script to the pharmacy.  It will be about the same price as the 10 day  Dr. Nichols

## 2021-06-03 VITALS — WEIGHT: 177.4 LBS | BODY MASS INDEX: 27.99 KG/M2

## 2021-06-04 NOTE — TELEPHONE ENCOUNTER
Medication Question or Clarification  Who is calling: Patient  What medication are you calling about? (include dose and sig)   lansoprazole (PREVACID) 30 MG capsule 90 capsule 1 10/7/2019     Sig: TAKE ONE CAPSULE BY MOUTH ONCE DAILY BEFORE A MEAL    Who prescribed the medication?: Rohan Nichols MD  What is your question/concern?: Patient states she is doing good taking  lansoprazole 15 mg  Requesting provider to change the prescription to lansoprazole 15 mg . Please advise.  Pharmacy: [facility] # [id number]  Okay to leave a detailed message?: No  Site CMT - Please call the pharmacy to obtain any additional needed information.

## 2021-06-04 NOTE — TELEPHONE ENCOUNTER
Reason contacted:  Rx request  Information relayed:  Informed patient of message of message below. Patient states understanding.  Additional questions:  No  Further follow-up needed:  No  Okay to leave a detailed message:  No

## 2021-06-04 NOTE — TELEPHONE ENCOUNTER
Who is calling:  Patient  Reason for Call:  Calling to check on the status of her medication request - She is wondering if she can take the 15 mg twice daily if needed for heartburn symptoms.   If twice daily dosing is approved by PCP - please send updated prescription with a quanitty of 180 tablets to her pharmacy. Please send with Normal class, last one written as OTC and not received by pharmacy.  Date of last appointment with primary care: 8.7.2019  Okay to leave a detailed message: Yes

## 2021-06-05 VITALS
BODY MASS INDEX: 27.12 KG/M2 | SYSTOLIC BLOOD PRESSURE: 139 MMHG | WEIGHT: 172.8 LBS | TEMPERATURE: 98.1 F | HEART RATE: 73 BPM | HEIGHT: 67 IN | DIASTOLIC BLOOD PRESSURE: 96 MMHG | RESPIRATION RATE: 16 BRPM

## 2021-06-09 ENCOUNTER — RECORDS - HEALTHEAST (OUTPATIENT)
Dept: ENDOCRINOLOGY | Facility: CLINIC | Age: 61
End: 2021-06-09

## 2021-06-09 DIAGNOSIS — M81.0 AGE-RELATED OSTEOPOROSIS WITHOUT CURRENT PATHOLOGICAL FRACTURE: ICD-10-CM

## 2021-06-09 NOTE — TELEPHONE ENCOUNTER
Refill Approved    Rx renewed per Medication Renewal Policy. Medication was last renewed on 1/3/20.    Ladan Meyers, [facility] Connection Triage/Med Refill 7/2/2020     Requested Prescriptions   Pending Prescriptions Disp Refills     lansoprazole (PREVACID) 15 MG capsule [Pharmacy Med Name: LANSOPRAZOLE 15MG DR CAPSULES] 180 capsule 1     Sig: TAKE ONE CAPSULE BY MOUTH TWICE DAILY       GI Medications Refill Protocol Passed - 6/30/2020  2:46 PM        Passed - PCP or prescribing provider visit in last 12 or next 3 months.     Last office visit with prescriber/PCP: 8/7/2019 Rohan Nichols MD OR same dept: 8/7/2019 Rohan Nichols MD OR same specialty: 8/7/2019 Rohan Nichols MD  Last physical: Visit date not found Last MTM visit: Visit date not found   Next visit within 3 mo: Visit date not found  Next physical within 3 mo: Visit date not found  Prescriber OR PCP: Rohan Nichols MD  Last diagnosis associated with med order: 1. Gastroesophageal reflux disease, esophagitis presence not specified  - lansoprazole (PREVACID) 15 MG capsule [Pharmacy Med Name: LANSOPRAZOLE 15MG DR CAPSULES]; TAKE ONE CAPSULE BY MOUTH TWICE DAILY  Dispense: 180 capsule; Refill: 1    If protocol passes may refill for 12 months if within 3 months of last provider visit (or a total of 15 months).

## 2021-06-11 NOTE — TELEPHONE ENCOUNTER
RN cannot approve Refill Request    RN can NOT refill this medication PCP messaged that patient is overdue for Office Visit. Last office visit: 8/7/2019 Rohan Nichols MD Last Physical: Visit date not found Last MTM visit: Visit date not found Last visit same specialty: 8/7/2019 Rohan Nichols MD.  Next visit within 3 mo: Visit date not found  Next physical within 3 mo: Visit date not found      Claudia Love, [facility] Triage/Med Refill 9/19/2020    Requested Prescriptions   Pending Prescriptions Disp Refills     lansoprazole (PREVACID) 15 MG capsule [Pharmacy Med Name: LANSOPRAZOLE 15MG DR CAPSULES] 180 capsule 0     Sig: TAKE ONE CAPSULE BY MOUTH TWICE DAILY       GI Medications Refill Protocol Failed - 9/18/2020 10:32 AM        Failed - PCP or prescribing provider visit in last 12 or next 3 months.     Last office visit with prescriber/PCP: 8/7/2019 Rohan Nichols MD OR same dept: Visit date not found OR same specialty: 8/7/2019 Rohan Nichols MD  Last physical: Visit date not found Last MTM visit: Visit date not found   Next visit within 3 mo: Visit date not found  Next physical within 3 mo: Visit date not found  Prescriber OR PCP: Rohan Nichols MD  Last diagnosis associated with med order: 1. Gastroesophageal reflux disease, esophagitis presence not specified  - lansoprazole (PREVACID) 15 MG capsule [Pharmacy Med Name: LANSOPRAZOLE 15MG DR CAPSULES]; TAKE ONE CAPSULE BY MOUTH TWICE DAILY  Dispense: 180 capsule; Refill: 0    If protocol passes may refill for 12 months if within 3 months of last provider visit (or a total of 15 months).

## 2021-06-14 NOTE — PROGRESS NOTES
Follow Up Blood Pressure Check    Pretty Vigil is a 60 y.o. female recommended to follow up for blood pressure check by Rohan Nichols MD. Anihypertensive medications and adherence were verified: Yes.     Reason for visit: High blood pressure at last visit     Medication change at last visit: None at this time    Today's Vitals:   Vitals:    01/13/21 0845 01/13/21 0853   BP: 151/73 148/75   Patient Site: Left Arm Left Arm   Patient Position: Sitting Sitting   Cuff Size: Adult Regular Adult Large   Pulse: 97 91         Lowest blood pressure today is <180/<110 and they deny signs or symptoms of new onset: DENY, severe headache, fatigue, confusion, vision changes, chest pain, pounding in the chest, neck, ears, irregular heartbeat, difficulty breathing and blood in the urine.  Please inform patient of his/her blood pressure today.  If they are asymptomatic, the patient is to continue current medications.  This message will be routed to their provider, and they will be notified if a change in medication is recommended.        Susan Singletary    Current Outpatient Medications   Medication Sig Dispense Refill     CHOLECALCIFEROL, VITAMIN D3, (VITAMIN D3 ORAL) Take 2,000 Units by mouth daily.              cimetidine (TAGAMET) 200 MG tablet Take 1 tablet (200 mg total) by mouth 2 (two) times a day. 60 tablet 0     Lactobacillus rhamnosus GG (CULTURELLE) 15 billion cell CpSP Take 1 capsule by mouth 3 (three) times a day with meals.       lansoprazole (PREVACID) 15 MG capsule TAKE ONE CAPSULE BY MOUTH TWICE DAILY 180 capsule 0     OMEGA-3/DHA/EPA/FISH OIL (FISH OIL-OMEGA-3 FATTY ACIDS) 300-1,000 mg capsule Take 2 g by mouth daily.       triamcinolone (KENALOG) 0.1 % cream APPLY EXTERNALLY TO THE AFFECTED AREA TWICE DAILY AS NEEDED 15 g 0     vitamin E 100 UNIT capsule Take 100 Units by mouth daily.       zoledronic acid-mannitol-water (RECLAST) 5 mg/100 mL PgBk Infuse 5 mg into a venous catheter. Once yearly        Current Facility-Administered Medications   Medication Dose Route Frequency Provider Last Rate Last Admin     denosumab 60 mg (PROLIA 60 mg/ml)  60 mg Subcutaneous Q6 Months Jillian Cardona MD   60 mg at 10/08/15 7754

## 2021-06-14 NOTE — PROGRESS NOTES
Assessment:      Healthy female exam.    HCM  GERD  Elevated blood pressure  Plan:       All questions answered.    HCM-Pt is UTD with immunizations, mammo and pap- she follows with Gyne   GERD-current symptoms doing well when taking the medication twice daily- has break through symptoms with once daily dosing- encouraged her to continue with twice daily  Elevated blood pressure- blood pressure is elevated which is atypical- she will return in a couple of weeks for nurse blood pressure check.- if remains elevated will discuss medication.  Subjective:      Pretty Vigil is a 60 y.o. female who presents for an annual exam. . The patient participates in regular exercise: yes.Pt is here for annual exam.  She is fasting and interested in blood work.  She is UTD with most health care maintenance.  She would like the name of a gynecologist as her previous one has retired.She periodically is cutting down on PPI- then has break through symptoms- discussed keeping regular with twice daily dosing.  BP is up today- she will return for recheck in a couple of weeks.    Healthy Habits:   Regular Exercise: Yes  Sunscreen Use: Yes  Healthy Diet: Yes  Dental Visits Regularly: Yes  Seat Belt: Yes  Sexually active: Yes  Colonoscopy: Yes  Lipid Profile: Yes  Glucose Screen: Yes  Prevention of Osteoporosis: Yes  Last Dexa: has one scheduled        Immunization History   Administered Date(s) Administered     DT (pediatric) 12/01/1996     Hep A, historic 10/23/2007, 12/19/2008     Influenza, inj, historic,unspecified 10/27/2020     Influenza,inj,MDCK,PF,Quad >4yrs 12/31/2019     MMR 07/22/1999     Tdap 10/23/2007     ZOSTER, RECOMBINANT, IM 08/15/2019, 10/29/2019     Immunization status: up to date and documented.    No exam data present    Gynecologic History  No LMP recorded. Patient is postmenopausal.  Contraception: none  Last Pap: 2 years. Results were: normal  Last mammogram: 6 months. Results were: normal      OB History     Para Term  AB Living       0         SAB TAB Ectopic Multiple Live Births                   Current Outpatient Medications   Medication Sig Dispense Refill     CHOLECALCIFEROL, VITAMIN D3, (VITAMIN D3 ORAL) Take 2,000 Units by mouth daily.              Lactobacillus rhamnosus GG (CULTURELLE) 15 billion cell CpSP Take 1 capsule by mouth 3 (three) times a day with meals.       lansoprazole (PREVACID) 15 MG capsule TAKE ONE CAPSULE BY MOUTH TWICE DAILY 180 capsule 0     OMEGA-3/DHA/EPA/FISH OIL (FISH OIL-OMEGA-3 FATTY ACIDS) 300-1,000 mg capsule Take 2 g by mouth daily.       triamcinolone (KENALOG) 0.1 % cream APPLY EXTERNALLY TO THE AFFECTED AREA TWICE DAILY AS NEEDED 15 g 0     vitamin E 100 UNIT capsule Take 100 Units by mouth daily.       zoledronic acid-mannitol-water (RECLAST) 5 mg/100 mL PgBk Infuse 5 mg into a venous catheter. Once yearly       cimetidine (TAGAMET) 200 MG tablet Take 1 tablet (200 mg total) by mouth 2 (two) times a day. 60 tablet 0     Current Facility-Administered Medications   Medication Dose Route Frequency Provider Last Rate Last Admin     denosumab 60 mg (PROLIA 60 mg/ml)  60 mg Subcutaneous Q6 Months Jillian Cardona MD   60 mg at 10/08/15 1149     Past Medical History:   Diagnosis Date     Anxiety      Esophageal candidiasis (H)      Hot flashes      Migraine headache      Premature menopause      Past Surgical History:   Procedure Laterality Date     DE KNEE SCOPE,DIAGNOSTIC      Description: Arthroscopy Knee Right;  Proc Date: 1995;     Patient has no known allergies.  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   Social Needs     Financial resource strain: Not on file     Food insecurity     Worry: Not on file     Inability: Not on file     Transportation needs     Medical: Not on file     Non-medical: Not on file   Tobacco Use     Smoking status: Former Smoker     Quit date: 1982     Years since quittin.0     Smokeless tobacco: Never Used   Substance and Sexual Activity     Alcohol use: Not on file     Drug use: Not on file     Sexual activity: Not on file   Lifestyle     Physical activity     Days per week: Not on file     Minutes per session: Not on file     Stress: Not on file   Relationships     Social connections     Talks on phone: Not on file     Gets together: Not on file     Attends Alevism service: Not on file     Active member of club or organization: Not on file     Attends meetings of clubs or organizations: Not on file     Relationship status: Not on file     Intimate partner violence     Fear of current or ex partner: Not on file     Emotionally abused: Not on file     Physically abused: Not on file     Forced sexual activity: Not on file   Other Topics Concern     Not on file   Social History Narrative     Not on file       Review of Systems  General:  Denies problem  Eyes: Denies problem  Ears/Nose/Throat: Denies problem  Cardiovascular: Denies problem  Respiratory:  Denies problem  Gastrointestinal:  Denies problem, Genitourinary: Denies problem  Musculoskeletal:  Denies problem  Skin: Denies problem  Neurologic: Denies problem  Psychiatric: Denies problem  Endocrine: Denies problem  Heme/Lymphatic: Denies problem   Allergic/Immunologic: Denies problem        Objective:         Vitals:    21 0938   BP: (!) 139/96   Pulse: 73   Resp: 16   Temp: 98.1  F (36.7  C)   TempSrc: Oral   Weight: 172 lb 12.8  "oz (78.4 kg)   Height: 5' 6.5\" (1.689 m)     Body mass index is 27.47 kg/m .    Physical Exam:  General Appearance: Alert, cooperative, no distress, appears stated age  Head: Normocephalic, without obvious abnormality, atraumatic  Eyes: PERRL, conjunctiva/corneas clear, EOM's intact  Ears: Normal TM's and external ear canals, both ears  Nose: Nares normal, septum midline,mucosa normal, no drainage  Back: Symmetric, no curvature, ROM normal, no CVA tenderness  Lungs: Clear to auscultation bilaterally, respirations unlabored  Breasts: Not examined  Heart: Regular rate and rhythm, S1 and S2 normal, no murmur, rub, or gallop, Abdomen: Soft, non-tender, bowel sounds active  Pelvic:Not examined  Extremities: Extremities normal, atraumatic, no cyanosis or edema  Skin: Skin color, texture, turgor normal, no rashes or lesions  Neurologic: Normal        "

## 2021-06-14 NOTE — PROGRESS NOTES
Pt agrees to start lisinopril 10 mg daily, please send to pharmacy.  Pt will be leaving for [place] Sunday so she will send [employer] message with updated BP in 2 weeks.

## 2021-06-14 NOTE — TELEPHONE ENCOUNTER
First Attempt: I spoke with patient she says she was driving so she was unable to schedule a physical right now but says she will call back to schedule this appt.

## 2021-06-14 NOTE — TELEPHONE ENCOUNTER
RN cannot approve Refill Request    RN can NOT refill this medication PCP messaged that patient is overdue for Office Visit. Last office visit: 8/7/2019 Rohan Nichols MD Last Physical: Visit date not found Last MTM visit: Visit date not found Last visit same specialty: 8/7/2019 Rohan Nichols MD.  Next visit within 3 mo: Visit date not found  Next physical within 3 mo: Visit date not found      Claudia Love, [facility] Triage/Med Refill 12/25/2020    Requested Prescriptions   Pending Prescriptions Disp Refills     lansoprazole (PREVACID) 15 MG capsule [Pharmacy Med Name: LANSOPRAZOLE 15MG DR CAPSULES] 180 capsule 0     Sig: TAKE ONE CAPSULE BY MOUTH TWICE DAILY       GI Medications Refill Protocol Failed - 12/23/2020  6:37 PM        Failed - PCP or prescribing provider visit in last 12 or next 3 months.     Last office visit with prescriber/PCP: 8/7/2019 Rohan Nichols MD OR same dept: Visit date not found OR same specialty: 8/7/2019 Rohan Nichols MD  Last physical: Visit date not found Last MTM visit: Visit date not found   Next visit within 3 mo: Visit date not found  Next physical within 3 mo: Visit date not found  Prescriber OR PCP: Rohan Nichols MD  Last diagnosis associated with med order: 1. Gastroesophageal reflux disease  - lansoprazole (PREVACID) 15 MG capsule [Pharmacy Med Name: LANSOPRAZOLE 15MG DR CAPSULES]; TAKE ONE CAPSULE BY MOUTH TWICE DAILY  Dispense: 180 capsule; Refill: 0    If protocol passes may refill for 12 months if within 3 months of last provider visit (or a total of 15 months).

## 2021-06-14 NOTE — PROGRESS NOTES
BP remains elevated- would recommend starting low dose lisinopril 10mg daily.  If patient is in agreement I will send to pharmacy and she can recheck blood pressure in 2 weeks.

## 2021-06-15 NOTE — PROGRESS NOTES
"Pretty Vigil is a 60 y.o. female who is being evaluated via a billable telephone visit.      What phone number would you like to be contacted at? [phone]  How would you like to obtain your AVS? AVS Preference: [place].  Assessment & Plan     Essential hypertension, benign  Patient has not started lisinopril  Blood pressure has been running borderline sometimes elevated sometimes not  After discussion with patient on risk factors for cardiovascular disease and maintaining good control of her blood pressure patient has elected to start lisinopril and will follow up in a couple months  - lisinopriL (PRINIVIL,ZESTRIL) 10 MG tablet  Dispense: 90 tablet; Refill: 0    Mixed hyperlipidemia  Discussed patient's current blood pressure and cholesterol levels  Cardiovascular risk over the next 10 years is less than 6%  Patient and I still discussed parameters that she can control and starting a low-dose statin would likely lower her risk to around 3%  Patient like to work on blood pressure first then consideration for statin therapy      23 minutes spent on the date of the encounter doing chart review, patient visit and documentation          BMI:   Estimated body mass index is 27.47 kg/m  as calculated from the following:    Height as of 1/7/21: 5' 6.5\" (1.689 m).    Weight as of 1/7/21: 172 lb 12.8 oz (78.4 kg).   The following high BMI interventions were performed this visit: encouragement to exercise      No follow-ups on file.    Rohan Nichols MD  [facility]    Subjective     Pretty Vigil is 60 y.o. and presents to clinic today for the following health issues   HPI     Patient interviewed via phone conversation today for follow-up regards to hypertension.  Patient has been monitoring her blood pressure at home  Her blood pressure has been bouncing from normal to abnormal in the 130s to 140 systolic and 70s to 80s diastolic.  We had a thorough discussion today of her " cardiovascular risk factors over the next 10 years and modifying lifestyle diet and exercise to help lower blood pressure and improve cholesterol levels.  We discussed statin therapy.  After further discussion patient would like to start on lisinopril which she has not done so follow-up in a couple months and consider starting statin medication.  Patient's current cardiovascular risk factor over the next 10 years is roughly around 6% starting low-dose statin would likely lower her to around 3% if she maintains adequate blood pressure control.  Patient's questions were answered today.  She will continue to monitor blood pressure at home.    Review of Systems  Negative other than stated above      Objective       Vitals:  No vitals were obtained today due to virtual visit.    Physical Exam  Phone interview today  Patient appeared in no apparent distress via phone        Phone call duration: 19 minutes

## 2021-06-15 NOTE — TELEPHONE ENCOUNTER
Reason for Call:  Other appointment     Detailed comments: has appt with Magdalena on 2/9/2021, needs to update him on BP readings:  1/18/2021 - 131/83  1/20/2021 - 128/80  1/21/2021 - 147/80  1/22/2021 - 123/77  1/23/2021 - 131/83  1/24/2021 - 127/72  1/25/2021 - 125/80  1/26/2021 - 142/80  1/30/2021 - 142/82  1/31/2021 - 152/90  2/2/2021 - 140/87  2/3/2021 - 130/83    Pt did not start the lisinopril yet, awaiting appt on 2/9/2021 to discuss again    Phone Number Patient can be reached at:   Cell number on file:    Telephone Information:   Mobile [phone]       Best Time: na    Can we leave a detailed message on this number?: No call back needed    Call taken on 2/8/2021 at 11:36 AM by Camelia Andrew

## 2021-06-16 NOTE — TELEPHONE ENCOUNTER
Refill Approved    Rx renewed per Medication Renewal Policy. Medication was last renewed on 12/28/20, 2/9/21.    Jessee Hackett, [facility] Triage/Med Refill 3/17/2021     Requested Prescriptions   Pending Prescriptions Disp Refills     lansoprazole (PREVACID) 15 MG capsule [Pharmacy Med Name: LANSOPRAZOLE 15MG DR CAPSULES] 180 capsule 0     Sig: TAKE ONE CAPSULE BY MOUTH TWICE DAILY       GI Medications Refill Protocol Passed - 3/15/2021  3:33 PM        Passed - PCP or prescribing provider visit in last 12 or next 3 months.     Last office visit with prescriber/PCP: 8/7/2019 Rohan Nichols MD OR same dept: Visit date not found OR same specialty: 8/7/2019 Rohan Nichols MD  Last physical: 1/7/2021 Last MTM visit: Visit date not found   Next visit within 3 mo: Visit date not found  Next physical within 3 mo: Visit date not found  Prescriber OR PCP: Rohan Nichols MD  Last diagnosis associated with med order: 1. Gastroesophageal reflux disease  - lansoprazole (PREVACID) 15 MG capsule [Pharmacy Med Name: LANSOPRAZOLE 15MG DR CAPSULES]; TAKE ONE CAPSULE BY MOUTH TWICE DAILY  Dispense: 180 capsule; Refill: 0    2. Essential hypertension, benign  - lisinopriL (PRINIVIL,ZESTRIL) 10 MG tablet [Pharmacy Med Name: LISINOPRIL 10MG TABLETS]; TAKE 1 TABLET(10 MG) BY MOUTH DAILY  Dispense: 90 tablet; Refill: 0    If protocol passes may refill for 12 months if within 3 months of last provider visit (or a total of 15 months).                lisinopriL (PRINIVIL,ZESTRIL) 10 MG tablet [Pharmacy Med Name: LISINOPRIL 10MG TABLETS] 90 tablet 0     Sig: TAKE 1 TABLET(10 MG) BY MOUTH DAILY       Ace Inhibitors Refill Protocol Passed - 3/15/2021  3:33 PM        Passed - PCP or prescribing provider visit in past 12 months       Last office visit with prescriber/PCP: 8/7/2019 Rohan Nichols MD OR same dept: Visit date not found OR same specialty: 8/7/2019 Rohan Nichols MD  Last physical: 1/7/2021  Last MTM visit: Visit date not found   Next visit within 3 mo: Visit date not found  Next physical within 3 mo: Visit date not found  Prescriber OR PCP: Rohan Nichols MD  Last diagnosis associated with med order: 1. Gastroesophageal reflux disease  - lansoprazole (PREVACID) 15 MG capsule [Pharmacy Med Name: LANSOPRAZOLE 15MG DR CAPSULES]; TAKE ONE CAPSULE BY MOUTH TWICE DAILY  Dispense: 180 capsule; Refill: 0    2. Essential hypertension, benign  - lisinopriL (PRINIVIL,ZESTRIL) 10 MG tablet [Pharmacy Med Name: LISINOPRIL 10MG TABLETS]; TAKE 1 TABLET(10 MG) BY MOUTH DAILY  Dispense: 90 tablet; Refill: 0    If protocol passes may refill for 12 months if within 3 months of last provider visit (or a total of 15 months).             Passed - Serum Potassium in past 12 months     Lab Results   Component Value Date    Potassium 4.2 01/07/2021             Passed - Blood pressure filed in past 12 months     BP Readings from Last 1 Encounters:   01/13/21 148/75             Passed - Serum Creatinine in past 12 months     Creatinine   Date Value Ref Range Status   01/07/2021 0.77 0.60 - 1.10 mg/dL Final

## 2021-06-20 NOTE — PROGRESS NOTES
ASSESSMENT/PLAN  1. Exposure to radon, initial encounter  Patient did radon testing in her home was found to have higher level she is having system placed at her home  She is concerned about her exposure as she has been living in this house for quite some time and she is worried about if there is been any changes  She like to do a chest x-ray to further evaluate if there can be noted any changes  X-ray looks clear with no acute changes discussed with her today will send to radiology  - XR Chest 2 Views; Future    2. Snoring  Patient has daytime fatigue and has noted to be a very loud snorer by her family  She like to be further evaluated with a sleep study which I am in agreement  Referral will be placed  - Ambulatory referral to Sleep Medicine    3. Health care maintenance  Patient is overdue for fasting blood work  She will return when fasting will check levels and follow-up based on results  Patient has known quite elevated cholesterol levels we discussed the possibility of starting a statin medication  She like to check levels and follow-up based on results  - HM2(CBC w/o Differential); Future  - Lipid Cascade; Future  - Comprehensive Metabolic Panel; Future  - C-Reactive Protein(CRP); Future    4. Gastroesophageal reflux disease without esophagitis  Patient feels current dosing is working well she like to continue with current medication refills upon request    5. LUQ abdominal pain  Patient has not had this symptom for the last couple weeks  Location and clinical history seem consistent with some digestive symptoms in the area of the colonic flexure from the transverse to the descending colon  Patient does note that she has quite frequent problems with gas and constipation  Discussed with her increasing fiber and hydration in the diet and working on more regularity with bowel movements  Symptoms not consistent with cardiac etiology        SUBJECTIVE:   Chief Complaint   Patient presents with     Chest Pain      c/o on 7/25/18 had episode of left sided anterior chest discomfort below breast, lasted about 30 minutes      Gastroesophageal Reflux     Medication check      Follow Up     House was recently tested for Radon and was high, would like to discuss this and if there is any follow up needed      Snoring     Pretty Vigil 57 y.o. female    Current Outpatient Prescriptions   Medication Sig Dispense Refill     CHOLECALCIFEROL, VITAMIN D3, (VITAMIN D3 ORAL) Take 1,000 Units by mouth daily.        diflorasone-emollient (APEXICON E) 0.05 % Crea Apply topically 2 (two) times a day.       Lactobacillus rhamnosus GG (CULTURELLE) 15 billion cell CpSP Take 1 capsule by mouth 3 (three) times a day with meals.       lansoprazole (PREVACID) 15 MG capsule Take 1 capsule by mouth daily.  11     OMEGA-3/DHA/EPA/FISH OIL (FISH OIL-OMEGA-3 FATTY ACIDS) 300-1,000 mg capsule Take 2 g by mouth daily.       PROGESTERONE MISC 50 mg.       ranitidine (ZANTAC) 150 MG tablet Take 1 tablet (150 mg total) by mouth at bedtime. 90 tablet 0     triamcinolone (KENALOG) 0.1 % cream APPLY EXTERNALLY TO THE AFFECTED AREA TWICE DAILY AS NEEDED 15 g 0     vitamin E 100 UNIT capsule Take 100 Units by mouth daily.       Current Facility-Administered Medications   Medication Dose Route Frequency Provider Last Rate Last Dose     denosumab 60 mg (PROLIA 60 mg/ml)  60 mg Subcutaneous Q6 Months Jillian Cardona MD   60 mg at 10/08/15 1149     Allergies: Review of patient's allergies indicates no known allergies.   No LMP recorded. Patient is postmenopausal.    HPI:   Patient is here to discuss an episode a [place] cabin of some left-sided anterior chest pain below her breasts that lasted for about 20-30 minutes.  There is no shortness of breath with the episode but she has never had these symptoms before.  She points to the area of the left upper abdominal quadrant in the area of the colon flexure.  The symptoms alleviated on their own and is been  "no repeat symptoms since.  She had no chest pain or shortness of breath otherwise.  She works out on a regular basis without chest pain.  She is wondering what could have caused this in the area we discussed different possible etiology more suspicious for gas distention at the colonic flexure.  Patient will like to monitor which I am in agreement.  She continues with her acid reflux medication which is doing well with-refills upon request    Patient knows that she has daytime fatigue and has had problems with snoring in the past she is concerned about an diagnosed sleep apnea and we discussed referral to sleep study which she is in agreement to have patient's house was recently tested for radon and was found to be high there to be installing the system to control that in the next month at their house but she is worried about previous exposure over the last few years that she has been living at home for many years.  Her father had lung cancer and she is concerning for the radon exposure-she like to further evaluate with imaging to make sure there is been no acute changes to her lungs.  We obtain this today and x-ray looks to be normal with no acute findings.    ROS: negative except as per HPI    OBJECTIVE:   The patient appears well, alert, oriented x 3, in no distress.  /72 (Patient Site: Left Arm, Patient Position: Sitting, Cuff Size: Adult Regular)  Pulse 80  Temp 98.2  F (36.8  C) (Oral)   Resp 16  Ht 5' 6.73\" (1.695 m)  Wt 169 lb 6.4 oz (76.8 kg)  BMI 26.75 kg/m2    Lungs: clear, good air entry, no wheezes, rhonchi or rales.   Cardiac: S1 and S2 normal, no murmurs, regular rate and rhythm.   Abdomen: normal bowel sounds, soft without tenderness, or guarding  Extremities: show no edema, normal peripheral pulses.   Neurological: normal, no focal findings.  Skin: clear, dry, no rashes/lesions  Psych- normal mood and affect      Pt states an understanding and agrees to the above plan.  Greater than 25 " minutes was spent today in interview and examination with Pretty Vigil with more than 50% of that time in counseling and coordination of care.

## 2021-06-21 NOTE — PROGRESS NOTES
I have faxed split night sleep study Rx, face sheet, and today's office visit note to [facility] location: fax # [phone].

## 2021-06-22 NOTE — PROGRESS NOTES
Dear Magdalena, Rohan North MD  [address] [place] [address],    Thank you for the opportunity to participate in the care of Pretty Vigil.     She is a 58 y.o. y/o female patient who comes to the sleep medicine clinic for a follow up visit.    We had an extensive conversation to review the results of her sleep study.    The overnight polysomnography was completed with a digital sleep system using the international 10-20 electrode placement for recording EEG, EOG, EMG from chin, ECG, respiratory effort, oximetry, body position, airflow, nasal pressure, snoring sound, pulse rate and limb movement channels.    The study was completed as a baseline study.    PSG was completed on 11/19/2018 at [facility].  AHI= 4.6    Dx mentions mild OSAH but  AHI is lower than 5.    Regarding her insomnia, she continues to experience poor sleep quality. She continues to ruminate at night. She is having insomnia almost every night.     Past Medical History:   Diagnosis Date     Anxiety      Esophageal candidiasis (H)      Hot flashes      Migraine headache      Premature menopause      Patient Active Problem List   Diagnosis     Chronic Constipation     Premature Menopause     Overweight     Eye Pain     Pain During Urination (Dysuria)     Cough     Anxiety     Esophageal Candidiasis     Menopause     Osteoporosis     Osteopenia     Vitamin D Deficiency     Migraine Headache     Essential Hypercholesterolemia     Esophageal Reflux     Nontoxic Nodular Goiter     Solitary nodule of right lobe of thyroid         Past Surgical History:   Procedure Laterality Date     [place] KNEE SCOPE,DIAGNOSTIC      Description: Arthroscopy Knee Right;  Proc Date: 06/18/1995;       Social History     Socioeconomic History     Marital status:      Spouse name: Not on file     Number of children: Not on file     Years of education: Not on file     Highest education level: Not on file   Social Needs     Financial resource strain: Not on  file     Food insecurity - worry: Not on file     Food insecurity - inability: Not on file     Transportation needs - medical: Not on file     Transportation needs - non-medical: Not on file   Occupational History     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   Tobacco Use     Smoking status: Former Smoker     Last attempt to quit: 1982     Years since quittin.9     Smokeless tobacco: Never Used   Substance and Sexual Activity     Alcohol use: Not on file     Drug use: Not on file     Sexual activity: Not on file   Other Topics Concern     Not on file   Social History Narrative     Not on file       Family History   Problem Relation Age of Onset     Breast cancer Mother      Kidney cancer Mother      Cancer Father         lung     Venous thrombosis Father         acute venous thrombosis of the lower extremities      Crohn's disease Father      Factor V Leiden deficiency Father      Hyperlipidemia Brother          Review of Systems:  General: No weight gain, no weight loss  Eyes: No vision changes  ENT: No hearing changes  Cardio: No chest pain, no nocturnal dyspnea  Respiratory: No shortness of breath, no cough  Gastrointestinal: No diarrhea, no constipation  Genitourinary: No excessive urination  Tegumentary: No rashes  Neurological: No seizures, no loss of consciousness  Endo: No heat or cold intolerance.    Current Outpatient Medications   Medication Sig Dispense Refill     CHOLECALCIFEROL, VITAMIN D3, (VITAMIN D3 ORAL) Take 1,000 Units by mouth daily.        diflorasone-emollient (APEXICON E) 0.05 % Crea Apply topically 2 (two) times a day.       Lactobacillus rhamnosus GG (CULTURELLE) 15 billion cell CpSP Take 1 capsule by mouth 3 (three) times a day with meals.       lansoprazole (PREVACID) 15 MG capsule Take 1 capsule by mouth daily.  11     OMEGA-3/DHA/EPA/FISH OIL (FISH OIL-OMEGA-3 FATTY ACIDS) 300-1,000 mg capsule Take 2 g by mouth daily.       PROGESTERONE MISC 50 mg.       ranitidine (ZANTAC) 150 MG  "tablet Take 1 tablet (150 mg total) by mouth at bedtime. 90 tablet 0     triamcinolone (KENALOG) 0.1 % cream APPLY EXTERNALLY TO THE AFFECTED AREA TWICE DAILY AS NEEDED 15 g 0     vitamin E 100 UNIT capsule Take 100 Units by mouth daily.       Current Facility-Administered Medications   Medication Dose Route Frequency Provider Last Rate Last Dose     denosumab 60 mg (PROLIA 60 mg/ml)  60 mg Subcutaneous Q6 Months Jillian Cardona MD   60 mg at 10/08/15 1149       No Known Allergies     Family History   Problem Relation Age of Onset     Breast cancer Mother      Kidney cancer Mother      Cancer Father         lung     Venous thrombosis Father         acute venous thrombosis of the lower extremities      Crohn's disease Father      Factor V Leiden deficiency Father      Hyperlipidemia Brother          Physical Exam:  Pulse 92   Ht 5' 6.75\" (1.695 m)   Wt 183 lb 6.4 oz (83.2 kg)   SpO2 99%   BMI 28.94 kg/m    BMI:Body mass index is 28.94 kg/m .   GEN: NAD.  Neurological: Alert, oriented to time, place, and person.  Psych: normal mood, normal affect     Labs/Studies:  - We reviewed the results of the overnight PSG as described on the HPI.     Lab Results   Component Value Date    WBC 5.6 08/27/2018    HGB 13.1 08/27/2018    HCT 38.1 08/27/2018    MCV 89 08/27/2018     08/27/2018         Chemistry        Component Value Date/Time     08/27/2018 0806    K 4.5 08/27/2018 0806     08/27/2018 0806    CO2 28 08/27/2018 0806    BUN 13 08/27/2018 0806    CREATININE 0.73 08/27/2018 0806     08/27/2018 0806        Component Value Date/Time    CALCIUM 9.2 08/27/2018 0806    ALKPHOS 68 08/27/2018 0806    AST 50 (H) 08/27/2018 0806    ALT 56 (H) 08/27/2018 0806    BILITOT 0.6 08/27/2018 0806            Assessment and Plan:  In summary Pretty Vigil is a 58 y.o. year old female here for follow up.    1. Primary Snoring.  We had an extensive conversation to review the results of her sleep study.  Her " AHI was in the normal range.  Regardless of whether her AHI is considered to be consistent with OSAH or not, her AHI is close to the normal range for her age and would not be associated with any significant changes in CV risk.  We discussed potential treatment options for her sleep-disordered breathing including MAD therapy and CPAP therapy for snoring/mild OSAH.  At this point, she is not ready to make a decision about treatment options.  She will take some time to think about our treatment options.  She has a history of insomnia that is related to anxiety and I am concerned that potential treatments for sleep-disordered breathing could worsen her insomnia. It is always possible that her sleep-disordered breathing could be a factor in her insomnia (20% of women have insomnia rather than hypersomnia with MONISHA) but this is less likely.      2. Insomnia.  She continues to have difficulties with her insomnia.  We will place a referral for CBTi.     Patient verbalized understanding of these issues, agrees with the plan and all questions were answered today. Patient was given an opportuntity to voice any other symptoms or concerns not listed above. Patient did not have any other symptoms or concerns.      I explained to the patient that I will be transitioning to a different job soon. I reassured her that this transition should not cause any significant disruptions to her care. I provided some information on the process and encouraged her to contact our main number if she has any questions or needs any help.    MD ROYCE Erazo Board Certified in Internal Medicine and Sleep Medicine  [facility].    We spent a total of 25 minutes of face-to-face encounter and more than 50% of the encounter was used for counseling or coordination of care.

## 2021-06-23 NOTE — TELEPHONE ENCOUNTER
Refill Approved    Rx renewed per Medication Renewal Policy. Medication was last renewed on 6/27/18    Dmitriy Min, [facility] [place] Connection Triage/Med Refill 2/9/2019     Requested Prescriptions   Pending Prescriptions Disp Refills     ranitidine (ZANTAC) 150 MG tablet [Pharmacy Med Name: RANITIDINE 150MG TABLETS] 90 tablet 0     Sig: TAKE 1 TABLET BY MOUTH AT BEDTIME    GI Medications Refill Protocol Passed - 2/7/2019  3:38 AM       Passed - PCP or prescribing provider visit in last 12 or next 3 months.    Last office visit with prescriber/PCP: 8/21/2018 Rohan Nichols MD OR same dept: 8/21/2018 Rohan Nichols MD OR same specialty: 8/21/2018 Rohan Nichols MD  Last physical: 9/2/2015 Last MTM visit: Visit date not found   Next visit within 3 mo: Visit date not found  Next physical within 3 mo: Visit date not found  Prescriber OR PCP: Rohan Nichols MD  Last diagnosis associated with med order: 1. Esophageal reflux  - ranitidine (ZANTAC) 150 MG tablet [Pharmacy Med Name: RANITIDINE 150MG TABLETS]; TAKE 1 TABLET BY MOUTH AT BEDTIME  Dispense: 90 tablet; Refill: 0    If protocol passes may refill for 12 months if within 3 months of last provider visit (or a total of 15 months).

## 2021-06-24 NOTE — TELEPHONE ENCOUNTER
Refill Approved    Rx renewed per Medication Renewal Policy. Medication was last renewed on 1/22/16.    Silva Calhoun, Care Connection Triage/Med Refill 3/8/2019     Requested Prescriptions   Pending Prescriptions Disp Refills     lansoprazole (PREVACID) 15 MG capsule [Pharmacy Med Name: LANSOPRAZOLE 15MG DR CAPSULES] 90 capsule 0     Sig: TAKE ONE CAPSULE BY MOUTH ONCE DAILY BEFORE A MEAL    GI Medications Refill Protocol Passed - 3/5/2019  2:29 PM       Passed - PCP or prescribing provider visit in last 12 or next 3 months.    Last office visit with prescriber/PCP: 8/21/2018 Rohan Nichols MD OR same dept: 8/21/2018 Rohan Nichols MD OR same specialty: 8/21/2018 Rohan Nichols MD  Last physical: 9/2/2015 Last MTM visit: Visit date not found   Next visit within 3 mo: Visit date not found  Next physical within 3 mo: Visit date not found  Prescriber OR PCP: Rohan Nichols MD  Last diagnosis associated with med order: 1. Esophageal reflux  - lansoprazole (PREVACID) 15 MG capsule [Pharmacy Med Name: LANSOPRAZOLE 15MG DR CAPSULES]; TAKE ONE CAPSULE BY MOUTH ONCE DAILY BEFORE A MEAL  Dispense: 90 capsule; Refill: 0    If protocol passes may refill for 12 months if within 3 months of last provider visit (or a total of 15 months).

## 2021-07-03 NOTE — ADDENDUM NOTE
Addendum Note by Rohan Nichols MD at 1/13/2021  8:40 AM     Author: Rohan Nichols MD Service: -- Author Type: Physician    Filed: 1/13/2021  5:14 PM Encounter Date: 1/13/2021 Status: Signed    : Rohan Nichols MD (Physician)    Addended by: ROHAN NICHOLS on: 1/13/2021 05:14 PM        Modules accepted: Orders

## 2021-07-03 NOTE — ADDENDUM NOTE
Addendum Note by Rohan Nichols MD at 8/9/2019  4:25 PM     Author: Rohan Nichols MD Service: -- Author Type: Physician    Filed: 8/9/2019  4:25 PM Encounter Date: 8/7/2019 Status: Signed    : Rohan Nichols MD (Physician)    Addended by: ROHAN NICHOLS on: 8/9/2019 04:25 PM        Modules accepted: Orders

## 2021-07-03 NOTE — ADDENDUM NOTE
Addendum Note by Moriah Davis CMA at 8/9/2019  3:09 PM     Author: Moriah Davis CMA Service: -- Author Type: Certified Medical Assistant    Filed: 8/9/2019  3:09 PM Encounter Date: 8/7/2019 Status: Signed    : Moriah Davis CMA (Certified Medical Assistant)    Addended by: MORIAH DAVIS on: 8/9/2019 03:09 PM        Modules accepted: Orders

## 2021-07-03 NOTE — ADDENDUM NOTE
Addendum Note by Rohan Nichols MD at 10/30/2020  2:49 PM     Author: Rohan Nichols MD Service: -- Author Type: Physician    Filed: 10/30/2020  2:49 PM Encounter Date: 10/30/2020 Status: Signed    : Rohan Nichols MD (Physician)    Addended by: ROHAN NICHOLS on: 10/30/2020 02:49 PM        Modules accepted: Orders

## 2021-07-13 ENCOUNTER — RECORDS - HEALTHEAST (OUTPATIENT)
Dept: ADMINISTRATIVE | Facility: CLINIC | Age: 61
End: 2021-07-13

## 2021-07-21 ENCOUNTER — RECORDS - HEALTHEAST (OUTPATIENT)
Dept: ADMINISTRATIVE | Facility: CLINIC | Age: 61
End: 2021-07-21

## 2021-08-29 ENCOUNTER — HEALTH MAINTENANCE LETTER (OUTPATIENT)
Age: 61
End: 2021-08-29

## 2021-10-24 ENCOUNTER — HEALTH MAINTENANCE LETTER (OUTPATIENT)
Age: 61
End: 2021-10-24

## 2021-11-18 ENCOUNTER — ANCILLARY PROCEDURE (OUTPATIENT)
Dept: MAMMOGRAPHY | Facility: CLINIC | Age: 61
End: 2021-11-18
Attending: FAMILY MEDICINE
Payer: COMMERCIAL

## 2021-11-18 DIAGNOSIS — Z12.31 VISIT FOR SCREENING MAMMOGRAM: ICD-10-CM

## 2021-11-18 PROCEDURE — 77063 BREAST TOMOSYNTHESIS BI: CPT

## 2021-12-06 ENCOUNTER — E-VISIT (OUTPATIENT)
Dept: FAMILY MEDICINE | Facility: CLINIC | Age: 61
End: 2021-12-06
Payer: COMMERCIAL

## 2021-12-06 ENCOUNTER — E-VISIT (OUTPATIENT)
Dept: FAMILY MEDICINE | Facility: CLINIC | Age: 61
End: 2021-12-06

## 2021-12-06 DIAGNOSIS — N64.4 BREAST PAIN: Primary | ICD-10-CM

## 2021-12-06 PROCEDURE — 99207 PR NON-BILLABLE SERV PER CHARTING: CPT | Performed by: FAMILY MEDICINE

## 2021-12-07 ENCOUNTER — TELEPHONE (OUTPATIENT)
Dept: FAMILY MEDICINE | Facility: CLINIC | Age: 61
End: 2021-12-07

## 2021-12-07 ENCOUNTER — E-VISIT (OUTPATIENT)
Dept: FAMILY MEDICINE | Facility: CLINIC | Age: 61
End: 2021-12-07
Payer: COMMERCIAL

## 2021-12-07 DIAGNOSIS — R39.89 URINARY PROBLEM: Primary | ICD-10-CM

## 2021-12-07 DIAGNOSIS — R30.0 DIFFICULT OR PAINFUL URINATION: ICD-10-CM

## 2021-12-07 DIAGNOSIS — R35.0 URINARY FREQUENCY: ICD-10-CM

## 2021-12-07 PROCEDURE — 99421 OL DIG E/M SVC 5-10 MIN: CPT | Performed by: FAMILY MEDICINE

## 2021-12-07 NOTE — TELEPHONE ENCOUNTER
Incoming call from patient wanting to come in to leave a urine sample. Pt stated she is having symptoms of a bladder infection. Communicated to patient I will have to send a message to the provider's team to see if provider is OK to place order first. Or if pt can submit an evisit. Pt would like if she can just do the urine sample instead of an office visit. Communicated to patient I will send the message and we will call her back. She stated understanding.

## 2021-12-08 NOTE — PATIENT INSTRUCTIONS
Dear Pretty Vigil,     After reviewing your responses, I would like you to come in for a urine test to make sure we treat you correctly. This urine test is to evaluate you for a possible urinary tract infection, and should be scheduled for today or tomorrow. Schedule a Lab Only appointment here.   I will place order for you today for urine analysis- then we can see if there is an infection.    Lab appointments are not available at most locations on the weekends. If no Lab Only appointment is available, you should be seen in any of our convenient Walk-in or Urgent Care Centers, which can be found on our website here.     You will receive instructions with your results in [employer] once they are available.     If your symptoms worsen, you develop pain in your back or stomach, develop fevers, or are not improving in 5 days, please contact your primary care provider for an appointment or visit a Walk-in or Urgent Care Center to be seen.     Thanks again for choosing us as your health care partner,     Rohan Nichols MD

## 2021-12-10 ENCOUNTER — OFFICE VISIT (OUTPATIENT)
Dept: FAMILY MEDICINE | Facility: CLINIC | Age: 61
End: 2021-12-10
Payer: COMMERCIAL

## 2021-12-10 VITALS
HEIGHT: 67 IN | RESPIRATION RATE: 14 BRPM | BODY MASS INDEX: 26.9 KG/M2 | OXYGEN SATURATION: 97 % | TEMPERATURE: 98 F | WEIGHT: 171.4 LBS | DIASTOLIC BLOOD PRESSURE: 64 MMHG | SYSTOLIC BLOOD PRESSURE: 110 MMHG | HEART RATE: 82 BPM

## 2021-12-10 DIAGNOSIS — N64.4 BREAST PAIN, LEFT: Primary | ICD-10-CM

## 2021-12-10 PROCEDURE — 99213 OFFICE O/P EST LOW 20 MIN: CPT | Performed by: FAMILY MEDICINE

## 2021-12-10 ASSESSMENT — MIFFLIN-ST. JEOR: SCORE: 1365.1

## 2021-12-10 NOTE — PROGRESS NOTES
"  Assessment & Plan   Problem List Items Addressed This Visit        Nervous and Auditory    Breast pain, left - Primary     Ultrasound ordered.          Relevant Orders    US Breast Left Limited 1-3 Quadrants         0956}     BMI:   Estimated body mass index is 26.85 kg/m  as calculated from the following:    Height as of this encounter: 1.702 m (5' 7\").    Weight as of this encounter: 77.7 kg (171 lb 6.4 oz).     There are no Patient Instructions on file for this visit.    Return if symptoms worsen or fail to improve.    Antoinette Whittaker MD  [facility]    Nate Olsen is a 61 year old who presents for the following health issues-she is feeling anxious because she is having pain in her left breast.  She had a mammogram 3 days before the pain started.  The pain is described as intermittent and lateral on the left breast it seems to be an achy sensation and comes and goes and lasts no longer than 30 seconds at a time.  She is leaving for [place] this coming Thursday and would like to get some imaging done prior to that time to further investigate this because her mother had breast cancer and she is very worried about it.    Interestingly she did have a normal mammogram 11/18/2021.      Objective    /64 (BP Location: Left arm, Patient Position: Sitting, Cuff Size: Adult Regular)   Pulse 82   Temp 98  F (36.7  C) (Oral)   Resp 14   Ht 1.702 m (5' 7\")   Wt 77.7 kg (171 lb 6.4 oz)   LMP  (LMP Unknown)   SpO2 97%   BMI 26.85 kg/m    Body mass index is 26.85 kg/m .  Physical Exam  Constitutional:       Appearance: Normal appearance.   HENT:      Head: Normocephalic and atraumatic.   Cardiovascular:      Rate and Rhythm: Normal rate and regular rhythm.   Pulmonary:      Effort: Pulmonary effort is normal.   Chest:   Breasts: Breasts are symmetrical.      Right: Normal. No swelling, bleeding, inverted nipple, mass, nipple discharge, skin change or tenderness.      Left: Normal. " No swelling, bleeding, inverted nipple, mass, nipple discharge, skin change or tenderness.       Musculoskeletal:         General: Normal range of motion.      Cervical back: Normal range of motion and neck supple.   Skin:     General: Skin is warm and dry.      Capillary Refill: Capillary refill takes less than 2 seconds.   Neurological:      General: No focal deficit present.      Mental Status: She is alert and oriented to person, place, and time.   Psychiatric:         Mood and Affect: Mood normal.         Behavior: Behavior normal.         Thought Content: Thought content normal.         Judgment: Judgment normal.          Answers for HPI/ROS submitted by the patient on 12/10/2021  How many servings of fruits and vegetables do you eat daily?: 2-3  On average, how many sweetened beverages do you drink each day (Examples: soda, juice, sweet tea, etc.  Do NOT count diet or artificially sweetened beverages)?: 2  How many minutes a day do you exercise enough to make your heart beat faster?: 30 to 60  How many days a week do you exercise enough to make your heart beat faster?: 4  How many days per week do you miss taking your medication?: 0

## 2021-12-13 ENCOUNTER — ANCILLARY PROCEDURE (OUTPATIENT)
Dept: MAMMOGRAPHY | Facility: CLINIC | Age: 61
End: 2021-12-13
Attending: FAMILY MEDICINE
Payer: COMMERCIAL

## 2021-12-13 DIAGNOSIS — N64.4 BREAST PAIN, LEFT: ICD-10-CM

## 2021-12-13 PROCEDURE — 76642 ULTRASOUND BREAST LIMITED: CPT | Mod: LT

## 2022-02-13 ENCOUNTER — HEALTH MAINTENANCE LETTER (OUTPATIENT)
Age: 62
End: 2022-02-13

## 2022-05-30 DIAGNOSIS — K21.9 GASTROESOPHAGEAL REFLUX DISEASE: ICD-10-CM

## 2022-05-30 DIAGNOSIS — I10 ESSENTIAL HYPERTENSION, BENIGN: ICD-10-CM

## 2022-05-31 RX ORDER — LISINOPRIL 10 MG/1
TABLET ORAL
Qty: 90 TABLET | Refills: 0 | Status: SHIPPED | OUTPATIENT
Start: 2022-05-31 | End: 2022-09-01

## 2022-05-31 RX ORDER — MECOBALAMIN 5000 MCG
TABLET,DISINTEGRATING ORAL
Qty: 180 CAPSULE | Refills: 0 | Status: SHIPPED | OUTPATIENT
Start: 2022-05-31 | End: 2022-09-01

## 2022-05-31 NOTE — TELEPHONE ENCOUNTER
"Routing refill request to provider for review/approval because:  Labs out of range:      Last Written Prescription Date:  3/2/22  Last Fill Quantity: 180,  # refills: 0   Last office visit provider:  12/10/21     Requested Prescriptions   Pending Prescriptions Disp Refills     LANsoprazole (PREVACID) 15 MG DR capsule [Pharmacy Med Name: LANSOPRAZOLE 15MG DR CAPSULES] 180 capsule 0     Sig: TAKE 1 CAPSULE BY MOUTH TWICE DAILY       PPI Protocol Failed - 5/30/2022  9:52 PM        Failed - No diagnosis of osteoporosis on record        Passed - Not on Clopidogrel (unless Pantoprazole ordered)        Passed - Recent (12 mo) or future (30 days) visit within the authorizing provider's specialty     Patient has had an office visit with the authorizing provider or a provider within the authorizing providers department within the previous 12 mos or has a future within next 30 days. See \"Patient Info\" tab in inbasket, or \"Choose Columns\" in Meds & Orders section of the refill encounter.              Passed - Medication is active on med list        Passed - Patient is age 18 or older        Passed - No active pregnacy on record        Passed - No positive pregnancy test in past 12 months           lisinopril (ZESTRIL) 10 MG tablet [Pharmacy Med Name: LISINOPRIL 10MG TABLETS] 90 tablet 0     Sig: TAKE 1 TABLET(10 MG) BY MOUTH DAILY       ACE Inhibitors (Including Combos) Protocol Failed - 5/30/2022  9:52 PM        Failed - Normal serum creatinine on file in past 12 months     Recent Labs   Lab Test 01/07/21  0948   CR 0.77       Ok to refill medication if creatinine is low          Failed - Normal serum potassium on file in past 12 months     Recent Labs   Lab Test 01/07/21  0948   POTASSIUM 4.2             Passed - Blood pressure under 140/90 in past 12 months     BP Readings from Last 3 Encounters:   12/10/21 110/64   01/13/21 (!) 148/75   01/07/21 (!) 139/96                 Passed - Recent (12 mo) or future (30 days) visit " "within the authorizing provider's specialty     Patient has had an office visit with the authorizing provider or a provider within the authorizing providers department within the previous 12 mos or has a future within next 30 days. See \"Patient Info\" tab in inbasket, or \"Choose Columns\" in Meds & Orders section of the refill encounter.              Passed - Medication is active on med list        Passed - Patient is age 18 or older        Passed - No active pregnancy on record        Passed - No positive pregnancy test within past 12 months             Ladan Meyers RN 05/31/22 10:37 AM  "

## 2022-08-31 DIAGNOSIS — K21.9 GASTROESOPHAGEAL REFLUX DISEASE: ICD-10-CM

## 2022-08-31 DIAGNOSIS — I10 ESSENTIAL HYPERTENSION, BENIGN: ICD-10-CM

## 2022-08-31 NOTE — TELEPHONE ENCOUNTER
"Routing refill request to provider for review/approval because:  Labs out of range:  Cr k    Last Written Prescription Date:  5/31/22  Last Fill Quantity: 90,  # refills: 0   Last office visit provider:  12/10/21     Requested Prescriptions   Pending Prescriptions Disp Refills     LANsoprazole (PREVACID) 15 MG DR capsule [Pharmacy Med Name: LANSOPRAZOLE 15MG DR CAPSULES] 180 capsule 0     Sig: TAKE 1 CAPSULE BY MOUTH TWICE DAILY       PPI Protocol Failed - 8/31/2022  2:45 PM        Failed - No diagnosis of osteoporosis on record        Passed - Not on Clopidogrel (unless Pantoprazole ordered)        Passed - Recent (12 mo) or future (30 days) visit within the authorizing provider's specialty     Patient has had an office visit with the authorizing provider or a provider within the authorizing providers department within the previous 12 mos or has a future within next 30 days. See \"Patient Info\" tab in inbasket, or \"Choose Columns\" in Meds & Orders section of the refill encounter.              Passed - Medication is active on med list        Passed - Patient is age 18 or older        Passed - No active pregnacy on record        Passed - No positive pregnancy test in past 12 months           lisinopril (ZESTRIL) 10 MG tablet [Pharmacy Med Name: LISINOPRIL 10MG TABLETS] 90 tablet 0     Sig: TAKE 1 TABLET(10 MG) BY MOUTH DAILY       ACE Inhibitors (Including Combos) Protocol Failed - 8/31/2022  2:45 PM        Failed - Normal serum creatinine on file in past 12 months     Recent Labs   Lab Test 01/07/21  0948   CR 0.77       Ok to refill medication if creatinine is low          Failed - Normal serum potassium on file in past 12 months     Recent Labs   Lab Test 01/07/21  0948   POTASSIUM 4.2             Passed - Blood pressure under 140/90 in past 12 months     BP Readings from Last 3 Encounters:   12/10/21 110/64   01/13/21 (!) 148/75   01/07/21 (!) 139/96                 Passed - Recent (12 mo) or future (30 days) " "visit within the authorizing provider's specialty     Patient has had an office visit with the authorizing provider or a provider within the authorizing providers department within the previous 12 mos or has a future within next 30 days. See \"Patient Info\" tab in inbasket, or \"Choose Columns\" in Meds & Orders section of the refill encounter.              Passed - Medication is active on med list        Passed - Patient is age 18 or older        Passed - No active pregnancy on record        Passed - No positive pregnancy test within past 12 months             Ladan Meyers RN 08/31/22 6:29 PM  "

## 2022-09-01 RX ORDER — MECOBALAMIN 5000 MCG
TABLET,DISINTEGRATING ORAL
Qty: 180 CAPSULE | Refills: 0 | Status: SHIPPED | OUTPATIENT
Start: 2022-09-01 | End: 2022-11-29

## 2022-09-01 RX ORDER — LISINOPRIL 10 MG/1
TABLET ORAL
Qty: 90 TABLET | Refills: 0 | Status: SHIPPED | OUTPATIENT
Start: 2022-09-01 | End: 2022-11-29

## 2022-10-15 ENCOUNTER — HEALTH MAINTENANCE LETTER (OUTPATIENT)
Age: 62
End: 2022-10-15

## 2022-11-07 ENCOUNTER — HOSPITAL ENCOUNTER (OUTPATIENT)
Dept: BONE DENSITY | Facility: HOSPITAL | Age: 62
Discharge: HOME | End: 2022-11-07
Attending: INTERNAL MEDICINE | Admitting: INTERNAL MEDICINE
Payer: COMMERCIAL

## 2022-11-07 DIAGNOSIS — M81.0 OSTEOPOROSIS, POSTMENOPAUSAL: ICD-10-CM

## 2022-11-07 PROCEDURE — 77080 DXA BONE DENSITY AXIAL: CPT

## 2022-11-28 DIAGNOSIS — I10 ESSENTIAL HYPERTENSION, BENIGN: ICD-10-CM

## 2022-11-28 DIAGNOSIS — K21.9 GASTROESOPHAGEAL REFLUX DISEASE: ICD-10-CM

## 2022-11-29 RX ORDER — MECOBALAMIN 5000 MCG
TABLET,DISINTEGRATING ORAL
Qty: 180 CAPSULE | Refills: 0 | Status: SHIPPED | OUTPATIENT
Start: 2022-11-29 | End: 2023-03-15

## 2022-11-29 RX ORDER — LISINOPRIL 10 MG/1
TABLET ORAL
Qty: 90 TABLET | Refills: 0 | Status: SHIPPED | OUTPATIENT
Start: 2022-11-29 | End: 2023-03-15

## 2022-11-29 NOTE — TELEPHONE ENCOUNTER
"Routing refill request to provider for review/approval because:  Labs not current:  Cr k  Needs review    Last Written Prescription Date:  9/1/22  Last Fill Quantity: 90,  # refills: 0   Last office visit provider:  12/10/21     Requested Prescriptions   Pending Prescriptions Disp Refills     LANsoprazole (PREVACID) 15 MG DR capsule [Pharmacy Med Name: LANSOPRAZOLE 15MG DR CAPSULES] 180 capsule 0     Sig: TAKE 1 CAPSULE BY MOUTH TWICE DAILY       PPI Protocol Failed - 11/28/2022  3:09 PM        Failed - No diagnosis of osteoporosis on record        Passed - Not on Clopidogrel (unless Pantoprazole ordered)        Passed - Recent (12 mo) or future (30 days) visit within the authorizing provider's specialty     Patient has had an office visit with the authorizing provider or a provider within the authorizing providers department within the previous 12 mos or has a future within next 30 days. See \"Patient Info\" tab in inbasket, or \"Choose Columns\" in Meds & Orders section of the refill encounter.              Passed - Medication is active on med list        Passed - Patient is age 18 or older        Passed - No active pregnacy on record        Passed - No positive pregnancy test in past 12 months           lisinopril (ZESTRIL) 10 MG tablet [Pharmacy Med Name: LISINOPRIL 10MG TABLETS] 90 tablet 0     Sig: TAKE 1 TABLET(10 MG) BY MOUTH DAILY       ACE Inhibitors (Including Combos) Protocol Failed - 11/28/2022  3:09 PM        Failed - Normal serum creatinine on file in past 12 months     Recent Labs   Lab Test 01/07/21  0948   CR 0.77       Ok to refill medication if creatinine is low          Failed - Normal serum potassium on file in past 12 months     Recent Labs   Lab Test 01/07/21  0948   POTASSIUM 4.2             Passed - Blood pressure under 140/90 in past 12 months     BP Readings from Last 3 Encounters:   12/10/21 110/64   01/13/21 (!) 148/75   01/07/21 (!) 139/96                 Passed - Recent (12 mo) or future " "(30 days) visit within the authorizing provider's specialty     Patient has had an office visit with the authorizing provider or a provider within the authorizing providers department within the previous 12 mos or has a future within next 30 days. See \"Patient Info\" tab in inbasket, or \"Choose Columns\" in Meds & Orders section of the refill encounter.              Passed - Medication is active on med list        Passed - Patient is age 18 or older        Passed - No active pregnancy on record        Passed - No positive pregnancy test within past 12 months             Ladan Meyers, RN 11/29/22 9:18 AM  "

## 2022-12-21 ENCOUNTER — ANCILLARY PROCEDURE (OUTPATIENT)
Dept: MAMMOGRAPHY | Facility: CLINIC | Age: 62
End: 2022-12-21
Attending: FAMILY MEDICINE
Payer: COMMERCIAL

## 2022-12-21 DIAGNOSIS — Z12.31 VISIT FOR SCREENING MAMMOGRAM: ICD-10-CM

## 2022-12-21 PROCEDURE — 77067 SCR MAMMO BI INCL CAD: CPT

## 2023-03-15 DIAGNOSIS — K21.9 GASTROESOPHAGEAL REFLUX DISEASE: ICD-10-CM

## 2023-03-15 DIAGNOSIS — I10 ESSENTIAL HYPERTENSION, BENIGN: ICD-10-CM

## 2023-03-16 RX ORDER — MECOBALAMIN 5000 MCG
15 TABLET,DISINTEGRATING ORAL 2 TIMES DAILY
Qty: 60 CAPSULE | Refills: 0 | Status: SHIPPED | OUTPATIENT
Start: 2023-03-16 | End: 2023-03-17

## 2023-03-16 RX ORDER — LISINOPRIL 10 MG/1
10 TABLET ORAL DAILY
Qty: 30 TABLET | Refills: 0 | Status: SHIPPED | OUTPATIENT
Start: 2023-03-16 | End: 2023-03-17

## 2023-03-17 ENCOUNTER — TELEPHONE (OUTPATIENT)
Dept: FAMILY MEDICINE | Facility: CLINIC | Age: 63
End: 2023-03-17

## 2023-03-17 DIAGNOSIS — I10 ESSENTIAL HYPERTENSION, BENIGN: ICD-10-CM

## 2023-03-17 DIAGNOSIS — K21.9 GASTROESOPHAGEAL REFLUX DISEASE: ICD-10-CM

## 2023-03-17 DIAGNOSIS — K21.00 GASTROESOPHAGEAL REFLUX DISEASE WITH ESOPHAGITIS WITHOUT HEMORRHAGE: Primary | ICD-10-CM

## 2023-03-17 RX ORDER — MECOBALAMIN 5000 MCG
15 TABLET,DISINTEGRATING ORAL 2 TIMES DAILY
Qty: 180 CAPSULE | Refills: 0 | Status: SHIPPED | OUTPATIENT
Start: 2023-03-17 | End: 2023-06-17

## 2023-03-17 RX ORDER — LISINOPRIL 10 MG/1
10 TABLET ORAL DAILY
Qty: 90 TABLET | Refills: 0 | Status: SHIPPED | OUTPATIENT
Start: 2023-03-17 | End: 2023-06-19

## 2023-03-17 NOTE — TELEPHONE ENCOUNTER
Please inform patient a 90 day supply sent- 30 day supplies sent as patient has not been seen for over a year- I have not seen patient for 2 years.  Pt needs to schedule follow up at clinic for further refills.

## 2023-03-17 NOTE — TELEPHONE ENCOUNTER
Patient is calling stating she has always had 90 day supplies on her prescription refills for lisinopril and lansoprazole. Patient went to go  her prescription and it was only for 30 days on her lisinopril with 0 refills and she was unable to  the lansoprazole as they are out of this medication till Monday,but the qty is 60 with 0 refills. Patient wanted me to ask Dr. Nichols if he would send in for a 90 day for both as she has always had these prescriptions for 90 days.  Please advise.

## 2023-03-17 NOTE — TELEPHONE ENCOUNTER
Informed patient of providers message below. Patient will schedule a med check/ physical. Patient does not have insurance at this time, she is waiting till she turns 63 to go onto medicare. Gave patient tasha number so she can calculate the cost of coming in so she can get more refills when needed. Patient wanted provider aware.

## 2023-03-26 ENCOUNTER — HEALTH MAINTENANCE LETTER (OUTPATIENT)
Age: 63
End: 2023-03-26

## 2023-04-24 ENCOUNTER — NURSE TRIAGE (OUTPATIENT)
Dept: FAMILY MEDICINE | Facility: CLINIC | Age: 63
End: 2023-04-24

## 2023-04-24 NOTE — TELEPHONE ENCOUNTER
Please help patient schedule with next available provider at clinic for evaluation- should be seen in person

## 2023-04-24 NOTE — TELEPHONE ENCOUNTER
Nurse Triage SBAR    Is this a 2nd Level Triage? NO    Situation:   Patient calling in to report an escalation of discomfort in her left breast.  Pain is located at the 2 to 4 o'clock position on the breast extending from her armpit to the nipple.  Patient states that the original ache/discomfort remains the same intensity, however she says that she notices this more. Approximately 60% of the day it is present whereas before it may have only been 25%.      She has had a an ultrasound of the breast as well as two mammograms which were negative thus far  Background:   Patient states this is an ongoing concern first noted after mammogram in December 2021.    Patient states that she has a history of breast cancer in her family.    Assessment:   Patient is not reporting any red flag symptoms, she states she otherwise feels totally fine except for noticing the discomfort for longer periods of time.  She does not report any change in breast shape, no lumps, or redness/rash, no nipple discharge.  Protocol Recommended Disposition:   See in Office Within 2 Weeks    Recommendation:   Please advise, patient has an appointment with you in June but says she cannot wait that long to investigate this.  Is this something that you would like to see her for sooner, order additional imaging, or have her see a different provider?    Routed to provider    Does the patient meet one of the following criteria for ADS visit consideration? 16+ years old, with an [facility] PCP     TIP  Providers, please consider if this condition is appropriate for management at one of our Acute and Diagnostic Services sites.     If patient is a good candidate, please use dotphrase <dot>triageresponse and select Refer to ADS to document.    Reason for Disposition    Breast pain and cause is not known    Additional Information    Negative: Chest pain    Negative: Breastfeeding questions about baby    Negative: Breastfeeding questions about mother (breast symptoms  "or feeling sick)    Negative: Breastfeeding questions about mother's medicines and diet    Negative: Postpartum breast pain and swelling, not breastfeeding    Negative: Small spot, skin growth or mole    Negative: SEVERE breast pain and fever > 103 F  (39.4 C)    Negative: Breast looks infected (spreading redness, feels hot or painful to touch) and fever    Negative: Patient sounds very sick or weak to the triager    Negative: Breast looks infected (spreading redness, feels hot or painful to touch) and no fever    Negative: Painful rash and multiple small blisters grouped together (i.e., dermatomal distribution or \"band\" or \"stripe\")    Negative: Cuts, burns, or bruises of breasts and suspicious history for the injury    Negative: Breast lump    Negative: Nipple discharge and bloody    Negative: Nipple is inverted (i.e., points inward)  (Exception: long-term physical characteristic, present for many years)    Negative: Dry flaking-peeling skin of nipple    Negative: Change in shape or appearance of breast    Negative: Dimpling of skin of breast (i.e., skin looks like the outside of an orange peel)    Negative: Patient wants to be seen    Negative: Breast tenderness and fullness and pregnant    Negative: Nipple discharge and not bloody (e.g., clear, white, yellow, brown, green)    Negative: Breast pain or tenderness that occurs monthly before menstrual period, and has NOT been evaluated by a doctor (or NP/PA)    Answer Assessment - Initial Assessment Questions  1. SYMPTOM: \"What's the main symptom you're concerned about?\"  (e.g., lump, pain, rash, nipple discharge)      Breast pain  2. LOCATION: \"Where is the Breast pain located?\"      Left breast   3. ONSET: \"When did breast pain  start?\"      12/21 following a mammogram  4. PRIOR HISTORY: \"Do you have any history of prior problems with your breasts?\" (e.g., lumps, cancer, fibrocystic breast disease)      No  5. CAUSE: \"What do you think is causing this symptom?\"    " "  She is unsure  6. OTHER SYMPTOMS: \"Do you have any other symptoms?\" (e.g., fever, breast pain, redness or rash, nipple discharge)      No  7. PREGNANCY-BREASTFEEDING: \"Is there any chance you are pregnant?\" \"When was your last menstrual period?\" \"Are you breastfeeding?\"      No    Protocols used: BREAST SYMPTOMS-A-[place]      "

## 2023-04-25 NOTE — TELEPHONE ENCOUNTER
Called and spoke with patient, assisted in scheduling with Dr. Whittaker at [facility] per patient request for tomorrow 4/26 to evaluate.    Yana Randolph RN

## 2023-04-26 ENCOUNTER — OFFICE VISIT (OUTPATIENT)
Dept: FAMILY MEDICINE | Facility: CLINIC | Age: 63
End: 2023-04-26

## 2023-04-26 VITALS
DIASTOLIC BLOOD PRESSURE: 67 MMHG | HEART RATE: 75 BPM | HEIGHT: 67 IN | SYSTOLIC BLOOD PRESSURE: 130 MMHG | WEIGHT: 164.6 LBS | BODY MASS INDEX: 25.83 KG/M2 | OXYGEN SATURATION: 99 % | RESPIRATION RATE: 16 BRPM

## 2023-04-26 DIAGNOSIS — Z12.4 CERVICAL CANCER SCREENING: ICD-10-CM

## 2023-04-26 DIAGNOSIS — Z11.59 NEED FOR HEPATITIS C SCREENING TEST: ICD-10-CM

## 2023-04-26 DIAGNOSIS — Z53.9 ERRONEOUS ENCOUNTER--DISREGARD: Primary | ICD-10-CM

## 2023-04-28 ENCOUNTER — OFFICE VISIT (OUTPATIENT)
Dept: FAMILY MEDICINE | Facility: CLINIC | Age: 63
End: 2023-04-28

## 2023-04-28 VITALS
SYSTOLIC BLOOD PRESSURE: 138 MMHG | OXYGEN SATURATION: 99 % | BODY MASS INDEX: 26.58 KG/M2 | TEMPERATURE: 98.5 F | HEART RATE: 78 BPM | DIASTOLIC BLOOD PRESSURE: 79 MMHG | RESPIRATION RATE: 12 BRPM | WEIGHT: 169.38 LBS | HEIGHT: 67 IN

## 2023-04-28 DIAGNOSIS — E78.00 PURE HYPERCHOLESTEROLEMIA: ICD-10-CM

## 2023-04-28 DIAGNOSIS — Z11.59 NEED FOR HEPATITIS C SCREENING TEST: ICD-10-CM

## 2023-04-28 DIAGNOSIS — N64.4 BREAST PAIN, LEFT: Primary | ICD-10-CM

## 2023-04-28 PROCEDURE — 99214 OFFICE O/P EST MOD 30 MIN: CPT | Performed by: FAMILY MEDICINE

## 2023-04-28 NOTE — PROGRESS NOTES
"  Assessment & Plan     ICD-10-CM    1. Breast pain, left  N64.4       2. Need for hepatitis C screening test  Z11.59       3. Essential Hypercholesterolemia  E78.00 CT Coronary Calcium Scan        Medical decision making: Patient follows up for her left breast pain which is bothersome to her.  This is more in the axillary line in the upper area than in the breast tissue.  She has had ultrasound and mammogram done.  She has had clinical exam done.  Symptoms have been ongoing for a year.  At this time again exam is normal.  I do believe this may be musculoskeletal or radiculopathy and further evaluation with MRI of neck can be pursued.  Specially as noted kyphosis.  Patient would like to postpone it due to her insurance and this is reasonable.  However, I do note she has essential hypercholesterolemia.  Last coronary CT calcium scan was in 2011.  CT coronary calcium can give us some input about her current arteries and an additional the radiology read can also reflect on any structural MSK issues until we pursue an MRI.  Patient verbalizes understanding.     BMI:   Estimated body mass index is 26.53 kg/m  as calculated from the following:    Height as of this encounter: 1.702 m (5' 7\").    Weight as of this encounter: 76.8 kg (169 lb 6 oz).       MEDICATIONS:  Continue current medications without change    Sarah Beth Dan MD  [facility]    Nate Olsen is a 62 year old, presenting for the following health issues:  Breast Pain (Left breast)        4/26/2023     2:58 PM   Additional Questions   Roomed by Edvin Vallecillo MA   Accompanied by Self     History of Present Illness       Reason for visit:  Left Breat Discomfort    She eats 2-3 servings of fruits and vegetables daily.She consumes 0 sweetened beverage(s) daily.She exercises with enough effort to increase her heart rate 60 or more minutes per day.  She exercises with enough effort to increase her heart rate 6 days per week. " "  She is taking medications regularly.     Patient Active Problem List   Diagnosis     Chronic Constipation     Premature Menopause     Osteopenia     Vitamin D Deficiency     Esophageal Reflux     Solitary nodule of right lobe of thyroid     Breast pain, left     Current Outpatient Medications   Medication     CHOLECALCIFEROL, VITAMIN D3, (VITAMIN D3 ORAL)     Lactobacillus rhamnosus GG (CULTURELLE) 15 billion cell CpSP     LANsoprazole (PREVACID) 15 MG DR capsule     lisinopril (ZESTRIL) 10 MG tablet     OMEGA-3/DHA/EPA/FISH OIL (FISH OIL-OMEGA-3 FATTY ACIDS) 300-1,000 mg capsule     No current facility-administered medications for this visit.       Review of Systems   Constitutional, HEENT, cardiovascular, pulmonary, gi and gu systems are negative, except as otherwise noted.      Objective    /79 (BP Location: Left arm, Patient Position: Sitting, Cuff Size: Adult Regular)   Pulse 78   Temp 98.5  F (36.9  C) (Oral)   Resp 12   Ht 1.702 m (5' 7\")   Wt 76.8 kg (169 lb 6 oz)   LMP  (LMP Unknown)   SpO2 99%   BMI 26.53 kg/m    Body mass index is 26.53 kg/m .  Physical Exam   GENERAL: healthy, alert and no distress  NECK: no adenopathy, no asymmetry, masses, or scars and thyroid normal to palpation  RESP: lungs clear to auscultation - no rales, rhonchi or wheezes  BREAST: normal without masses, tenderness or nipple discharge and no palpable axillary masses or adenopathy  There is tenderness in both sides in the axillary area in 4-6 rib.  Left inverted nipple is noted.  CV: regular rate and rhythm, normal S1 S2, no S3 or S4, no murmur, click or rub, no peripheral edema and peripheral pulses strong  MS: No midline tenderness of the spine.  Noted kyphosis    Office Visit - [facility] on 01/07/2021   Component Date Value Ref Range Status     Sodium 01/07/2021 141  136 - 145 mmol/L Final     Potassium 01/07/2021 4.2  3.5 - 5.0 mmol/L Final     Chloride 01/07/2021 102  98 - 107 mmol/L Final     Carbon Dioxide " (CO2) 01/07/2021 26  22 - 31 mmol/L Final     Anion Gap 01/07/2021 13  5 - 18 mmol/L Final     Glucose 01/07/2021 94  70 - 125 mg/dL Final     Urea Nitrogen 01/07/2021 17  8 - 22 mg/dL Final     Creatinine 01/07/2021 0.77  0.60 - 1.10 mg/dL Final     GFR Estimate If Black 01/07/2021 >60  >60 mL/min/1.73m2 Final     GFR Estimate 01/07/2021 >60  >60 mL/min/1.73m2 Final     Bilirubin Total 01/07/2021 0.4  0.0 - 1.0 mg/dL Final     Calcium 01/07/2021 10.2  8.5 - 10.5 mg/dL Final     Protein Total 01/07/2021 7.6  6.0 - 8.0 g/dL Final     Albumin 01/07/2021 4.2  3.5 - 5.0 g/dL Final     Alkaline Phosphatase 01/07/2021 87  45 - 120 U/L Final     AST 01/07/2021 26  0 - 40 U/L Final     ALT 01/07/2021 31  0 - 45 U/L Final     WBC 01/07/2021 6.8  4.0 - 11.0 thou/uL Final     RBC Count 01/07/2021 4.72  3.80 - 5.40 mill/uL Final     Hemoglobin 01/07/2021 14.4  12.0 - 16.0 g/dL Final     Hematocrit 01/07/2021 43.4  35.0 - 47.0 % Final     MCV 01/07/2021 92  80 - 100 fL Final     MCH 01/07/2021 30.4  27.0 - 34.0 pg Final     MCHC 01/07/2021 33.1  32.0 - 36.0 g/dL Final     RDW 01/07/2021 12.4  11.0 - 14.5 % Final     Platelet Count 01/07/2021 242  140 - 440 thou/uL Final     Mean Platelet Volume 01/07/2021 6.9 (L)  7.0 - 10.0 fL Final     Vitamin D, Total (25-Hydroxy) 01/07/2021 39.1  30.0 - 80.0 ng/mL Final     Cholesterol 01/07/2021 289 (H)  <=199 mg/dL Final     Triglycerides 01/07/2021 111  <=149 mg/dL Final     Direct Measure HDL 01/07/2021 63  >=50 mg/dL Final     LDL Cholesterol Calculated 01/07/2021 204 (H)  <=129 mg/dL Final     Patient Fasting > 8hrs? 01/07/2021 Yes   Final     Vitamin D, Total (25-Hydroxy) 01/07/2021 39.1  30.0 - 80.0 ng/mL Final

## 2023-06-05 ENCOUNTER — HOSPITAL ENCOUNTER (OUTPATIENT)
Dept: CT IMAGING | Facility: CLINIC | Age: 63
Discharge: HOME | End: 2023-06-05
Attending: FAMILY MEDICINE | Admitting: FAMILY MEDICINE

## 2023-06-05 DIAGNOSIS — E78.00 PURE HYPERCHOLESTEROLEMIA: ICD-10-CM

## 2023-06-05 LAB
CV CALCIUM SCORE AGATSTON LM: 0
CV CALCIUM SCORING AGATSON LAD: 41
CV CALCIUM SCORING AGATSTON CX: 0
CV CALCIUM SCORING AGATSTON RCA: 0
CV CALCIUM SCORING AGATSTON TOTAL: 41

## 2023-06-05 PROCEDURE — 75571 CT HRT W/O DYE W/CA TEST: CPT

## 2023-06-05 PROCEDURE — 75571 CT HRT W/O DYE W/CA TEST: CPT | Mod: 26 | Performed by: INTERNAL MEDICINE

## 2023-06-16 ENCOUNTER — OFFICE VISIT (OUTPATIENT)
Dept: FAMILY MEDICINE | Facility: CLINIC | Age: 63
End: 2023-06-16

## 2023-06-16 VITALS
OXYGEN SATURATION: 99 % | TEMPERATURE: 98.2 F | DIASTOLIC BLOOD PRESSURE: 79 MMHG | SYSTOLIC BLOOD PRESSURE: 129 MMHG | BODY MASS INDEX: 26.06 KG/M2 | HEART RATE: 79 BPM | WEIGHT: 166 LBS | HEIGHT: 67 IN | RESPIRATION RATE: 16 BRPM

## 2023-06-16 DIAGNOSIS — M54.6 CHRONIC LEFT-SIDED THORACIC BACK PAIN: Primary | ICD-10-CM

## 2023-06-16 DIAGNOSIS — K21.00 GASTROESOPHAGEAL REFLUX DISEASE WITH ESOPHAGITIS WITHOUT HEMORRHAGE: ICD-10-CM

## 2023-06-16 DIAGNOSIS — I10 ESSENTIAL HYPERTENSION, BENIGN: ICD-10-CM

## 2023-06-16 DIAGNOSIS — I10 PRIMARY HYPERTENSION: ICD-10-CM

## 2023-06-16 DIAGNOSIS — E78.2 MIXED HYPERLIPIDEMIA: ICD-10-CM

## 2023-06-16 DIAGNOSIS — G89.29 CHRONIC LEFT-SIDED THORACIC BACK PAIN: Primary | ICD-10-CM

## 2023-06-16 PROCEDURE — 99214 OFFICE O/P EST MOD 30 MIN: CPT | Performed by: FAMILY MEDICINE

## 2023-06-16 RX ORDER — SIMVASTATIN 20 MG
20 TABLET ORAL AT BEDTIME
Qty: 90 TABLET | Refills: 3 | Status: SHIPPED | OUTPATIENT
Start: 2023-06-16 | End: 2024-04-01

## 2023-06-16 NOTE — PROGRESS NOTES
"  Assessment & Plan     Chronic left-sided thoracic back pain  Patient with ongoing greater than a year left chest wall and axilla discomfort  Had mass improvement after chiropractic treatment  Suspect musculoskeletal irritation in the thoracic region of the spine causing symptoms in the last year to year and a half  Symptoms have improved greatly but we discussed a trial on physical therapy  Reviewed mammograms which have been negative reviewed calcium score which led to further conversation on statin please see below  - Physical Therapy Referral; Future    Mixed hyperlipidemia  After discussion patient is willing to do a trial on statin therapy we discussed simvastatin  - simvastatin (ZOCOR) 20 MG tablet; Take 1 tablet (20 mg) by mouth At Bedtime  Hypertension  Blood pressure at goal range on recheck continue with lisinopril 10 mg daily       BMI:   Estimated body mass index is 26.15 kg/m  as calculated from the following:    Height as of this encounter: 1.697 m (5' 6.81\").    Weight as of this encounter: 75.3 kg (166 lb).   Weight management plan: Discussed healthy diet and exercise guidelines        Rohan Nichols MD  [facility]    Nate Olsen is a 62 year old, presenting for the following health issues:  Breast Pain (Left sided breat discomfort ) and Hypertension (Medication check )  Patient here for follow-up regards to left chest wall discomfort and axilla symptoms on her left side.  Symptoms started over a year ago after mammogram and present for greater than a year in duration she was seen over a year ago and imaging was negative repeat mammogram this year was also negative and symptoms continued patient was seen by another provider who suggested CT coronary score we reviewed these results with her today and we discussed statin therapy due to her history of hyperlipidemia.  She does not have exertional symptoms and had chiropractic treatment a while ago for a " "misplaced rib she states almost immediately after treatment she had mass improvement of her symptoms but they do fluctuate still here and there.  There is no exertional components no shortness of breath currently she said she has been pretty asymptomatic but gets nervous about the possibility of what exact etiology it is.  We discussed the possibility of thoracic spine arthritis versus stenosis causing the symptoms periodically-we discussed a trial of physical therapy to help with thoracic discomfort.    We discussed statin therapy which she is in agreement to do a trial on simvastatin.  She is going on Medicare in the fall and plans on scheduling for annual wellness and medication check in the fall.  Blood pressures been running the normal range continue with lisinopril.      6/16/2023     8:50 AM   Additional Questions   Roomed by Myra PARIKH CMA     History of Present Illness       Reason for visit:  Prescription Refill    She eats 2-3 servings of fruits and vegetables daily.She consumes 0 sweetened beverage(s) daily.She exercises with enough effort to increase her heart rate 60 or more minutes per day.  She exercises with enough effort to increase her heart rate 5 days per week.   She is taking medications regularly.       Hypertension Follow-up      Do you check your blood pressure regularly outside of the clinic? No, does have machine at home, checking periodically      Are you following a low salt diet? Yes    Are your blood pressures ever more than 140 on the top number (systolic) OR more   than 90 on the bottom number (diastolic), for example 140/90? Yes          Review of Systems   Constitutional, HEENT, cardiovascular, pulmonary, gi and gu systems are negative, except as otherwise noted.      Objective    /84 (BP Location: Left arm, Patient Position: Sitting, Cuff Size: Adult Regular)   Pulse 79   Temp 98.2  F (36.8  C) (Oral)   Resp 16   Ht 1.697 m (5' 6.81\")   Wt 75.3 kg (166 lb)   LMP  (LMP " Unknown)   SpO2 99%   BMI 26.15 kg/m    Body mass index is 26.15 kg/m .  Physical Exam   GENERAL: healthy, alert and no distress  RESP: lungs clear to auscultation - no rales, rhonchi or wheezes  CV: regular rate and rhythm, normal S1 S2, no S3 or S4, no murmur, click or rub, no peripheral edema and peripheral pulses strong  MS: no gross musculoskeletal defects noted, no edema  NEURO: Normal strength and tone, sensory exam grossly normal, mentation intact and abnormal discomfort sensations in the left axilla and left lateral chest wall  PSYCH: mentation appears normal, affect normal/bright

## 2023-06-17 RX ORDER — MECOBALAMIN 5000 MCG
TABLET,DISINTEGRATING ORAL
Qty: 180 CAPSULE | Refills: 3 | Status: SHIPPED | OUTPATIENT
Start: 2023-06-17 | End: 2024-04-01

## 2023-06-17 NOTE — TELEPHONE ENCOUNTER
"Routing refill request to provider for review/approval because:  Labs not current:  Cr and k+    Last Written Prescription Date:  3/17/23  Last Fill Quantity: 90,  # refills: 0   Last office visit provider:  6/16/23     Requested Prescriptions   Pending Prescriptions Disp Refills     lisinopril (ZESTRIL) 10 MG tablet [Pharmacy Med Name: LISINOPRIL 10MG TABS] 90 tablet 0     Sig: TAKE ONE TABLET BY MOUTH ONCE DAILY       ACE Inhibitors (Including Combos) Protocol Failed - 6/17/2023 12:04 AM        Failed - Normal serum creatinine on file in past 12 months     Recent Labs   Lab Test 01/07/21  0948   CR 0.77       Ok to refill medication if creatinine is low          Failed - Normal serum potassium on file in past 12 months     Recent Labs   Lab Test 01/07/21  0948   POTASSIUM 4.2             Passed - Blood pressure under 140/90 in past 12 months     BP Readings from Last 3 Encounters:   06/16/23 129/79   04/28/23 138/79   04/26/23 130/67                 Passed - Recent (12 mo) or future (30 days) visit within the authorizing provider's specialty     Patient has had an office visit with the authorizing provider or a provider within the authorizing providers department within the previous 12 mos or has a future within next 30 days. See \"Patient Info\" tab in inbasket, or \"Choose Columns\" in Meds & Orders section of the refill encounter.              Passed - Medication is active on med list        Passed - Patient is age 18 or older        Passed - No active pregnancy on record        Passed - No positive pregnancy test within past 12 months         Signed Prescriptions Disp Refills    LANsoprazole (PREVACID) 15 MG DR capsule 180 capsule 3     Sig: TAKE 1 CAPSULE BY MOUTH 2 TIMES DAILY       PPI Protocol Passed - 6/17/2023 12:04 AM        Passed - Not on Clopidogrel (unless Pantoprazole ordered)        Passed - No diagnosis of osteoporosis on record        Passed - Recent (12 mo) or future (30 days) visit within the " "authorizing provider's specialty     Patient has had an office visit with the authorizing provider or a provider within the authorizing providers department within the previous 12 mos or has a future within next 30 days. See \"Patient Info\" tab in inbasket, or \"Choose Columns\" in Meds & Orders section of the refill encounter.              Passed - Medication is active on med list        Passed - Patient is age 18 or older        Passed - No active pregnacy on record        Passed - No positive pregnancy test in past 12 months             Ciara German RN 06/17/23 12:05 AM  "

## 2023-06-17 NOTE — TELEPHONE ENCOUNTER
"Prevacid  Last Written Prescription Date:  3/17/23  Last Fill Quantity: 180,  # refills: 0   Last office visit provider:  6/16/23     Requested Prescriptions   Pending Prescriptions Disp Refills     lisinopril (ZESTRIL) 10 MG tablet [Pharmacy Med Name: LISINOPRIL 10MG TABS] 90 tablet 0     Sig: TAKE ONE TABLET BY MOUTH ONCE DAILY       ACE Inhibitors (Including Combos) Protocol Failed - 6/17/2023 12:04 AM        Failed - Normal serum creatinine on file in past 12 months     Recent Labs   Lab Test 01/07/21  0948   CR 0.77       Ok to refill medication if creatinine is low          Failed - Normal serum potassium on file in past 12 months     Recent Labs   Lab Test 01/07/21  0948   POTASSIUM 4.2             Passed - Blood pressure under 140/90 in past 12 months     BP Readings from Last 3 Encounters:   06/16/23 129/79   04/28/23 138/79   04/26/23 130/67                 Passed - Recent (12 mo) or future (30 days) visit within the authorizing provider's specialty     Patient has had an office visit with the authorizing provider or a provider within the authorizing providers department within the previous 12 mos or has a future within next 30 days. See \"Patient Info\" tab in inbasket, or \"Choose Columns\" in Meds & Orders section of the refill encounter.              Passed - Medication is active on med list        Passed - Patient is age 18 or older        Passed - No active pregnancy on record        Passed - No positive pregnancy test within past 12 months           LANsoprazole (PREVACID) 15 MG DR capsule [Pharmacy Med Name: LANSOPRAZOLE 15MG CPDR] 180 capsule 0     Sig: TAKE 1 CAPSULE BY MOUTH 2 TIMES DAILY       PPI Protocol Passed - 6/17/2023 12:04 AM        Passed - Not on Clopidogrel (unless Pantoprazole ordered)        Passed - No diagnosis of osteoporosis on record        Passed - Recent (12 mo) or future (30 days) visit within the authorizing provider's specialty     Patient has had an office visit with the " "authorizing provider or a provider within the authorizing providers department within the previous 12 mos or has a future within next 30 days. See \"Patient Info\" tab in inbasket, or \"Choose Columns\" in Meds & Orders section of the refill encounter.              Passed - Medication is active on med list        Passed - Patient is age 18 or older        Passed - No active pregnacy on record        Passed - No positive pregnancy test in past 12 months             Ciara German RN 06/17/23 12:04 AM  "

## 2023-06-19 RX ORDER — LISINOPRIL 10 MG/1
TABLET ORAL
Qty: 90 TABLET | Refills: 0 | Status: SHIPPED | OUTPATIENT
Start: 2023-06-19 | End: 2023-08-04

## 2023-08-03 DIAGNOSIS — I10 ESSENTIAL HYPERTENSION, BENIGN: ICD-10-CM

## 2023-08-03 NOTE — TELEPHONE ENCOUNTER
"Routing refill request to provider for review/approval because:  Labs not current:  K+  (Creatinine within normal limits and checked within the past 12 months by external [facility])    Last Written Prescription Date:  6/19/2023  Last Fill Quantity: 90,  # refills: 0   Last office visit provider:  6/16/2023     Requested Prescriptions   Pending Prescriptions Disp Refills    lisinopril (ZESTRIL) 10 MG tablet [Pharmacy Med Name: LISINOPRIL 10MG TABS] 90 tablet 0     Sig: TAKE ONE TABLET BY MOUTH ONCE DAILY       ACE Inhibitors (Including Combos) Protocol Failed - 8/3/2023 10:04 AM        Failed - Normal serum creatinine on file in past 12 months     Recent Labs   Lab Test 01/07/21  0948   CR 0.77       Ok to refill medication if creatinine is low          Failed - Normal serum potassium on file in past 12 months     Recent Labs   Lab Test 01/07/21  0948   POTASSIUM 4.2             Passed - Blood pressure under 140/90 in past 12 months     BP Readings from Last 3 Encounters:   06/16/23 129/79   04/28/23 138/79   04/26/23 130/67                 Passed - Recent (12 mo) or future (30 days) visit within the authorizing provider's specialty     Patient has had an office visit with the authorizing provider or a provider within the authorizing providers department within the previous 12 mos or has a future within next 30 days. See \"Patient Info\" tab in inbasket, or \"Choose Columns\" in Meds & Orders section of the refill encounter.              Passed - Medication is active on med list        Passed - Patient is age 18 or older        Passed - No active pregnancy on record        Passed - No positive pregnancy test within past 12 months             Angy Kerr RN 08/03/23 1:27 PM  "

## 2023-08-04 RX ORDER — LISINOPRIL 10 MG/1
TABLET ORAL
Qty: 90 TABLET | Refills: 1 | Status: SHIPPED | OUTPATIENT
Start: 2023-08-04 | End: 2024-04-01

## 2023-09-23 ENCOUNTER — NURSE TRIAGE (OUTPATIENT)
Dept: NURSING | Facility: CLINIC | Age: 63
End: 2023-09-23
Payer: COMMERCIAL

## 2023-09-23 ENCOUNTER — HOSPITAL ENCOUNTER (EMERGENCY)
Facility: CLINIC | Age: 63
Discharge: HOME | End: 2023-09-23
Payer: COMMERCIAL

## 2023-09-23 VITALS
RESPIRATION RATE: 18 BRPM | OXYGEN SATURATION: 98 % | HEART RATE: 86 BPM | TEMPERATURE: 98.3 F | HEIGHT: 67 IN | DIASTOLIC BLOOD PRESSURE: 78 MMHG | BODY MASS INDEX: 26.21 KG/M2 | WEIGHT: 167 LBS | SYSTOLIC BLOOD PRESSURE: 161 MMHG

## 2023-09-23 DIAGNOSIS — B35.1 ONYCHOMYCOSIS: ICD-10-CM

## 2023-09-23 PROCEDURE — 99203 OFFICE O/P NEW LOW 30 MIN: CPT

## 2023-09-23 PROCEDURE — G0463 HOSPITAL OUTPT CLINIC VISIT: HCPCS

## 2023-09-23 RX ORDER — TERBINAFINE HYDROCHLORIDE 250 MG/1
250 TABLET ORAL DAILY
Qty: 45 TABLET | Refills: 0 | Status: SHIPPED | OUTPATIENT
Start: 2023-09-23 | End: 2023-11-07

## 2023-09-23 NOTE — ED PROVIDER NOTES
History     Chief Complaint   Patient presents with    Fungal Infection     Pt had her nails done in a salon last Saturday. She had nail policed removed at same location today. Fungal infection noted underneath nails, and nailbeds are reddened and swollen. Pt denies pain.     HPI  Pretty Vigil is a 63 year old female who presents to urgent care for concern of fungal nail infection.  Patient states that she often has manicures completed at a nail salon.  States after having her nail polish removed this last time she noted that her nails appeared to be slightly lifting and there is some irritation underneath the nails of the skin.  Patient states she has not ever had this before and was not noted prior to this.  She has noted that the nail seems to be affected about custodial throughout the nail starting at the distal aspect.  She is not having any significant pain, has not had any fevers, and has no other new concerns at this time.    Allergies:  No Known Allergies    Problem List:    Patient Active Problem List    Diagnosis Date Noted    Breast pain, left 12/10/2021     Priority: Medium    Chronic Constipation      Priority: Medium     Created by Conversion  Replacement Utility updated for latest IMO load        Osteopenia      Priority: Medium     Created by Conversion  Replacement Utility updated for latest IMO load        Vitamin D Deficiency      Priority: Medium     Created by Conversion  Replacement Utility updated for latest IMO load        Solitary nodule of right lobe of thyroid 10/08/2015     Priority: Medium    Esophageal Reflux      Priority: Medium     Created by Conversion        Premature Menopause      Priority: Medium     Created by Conversion            Past Medical History:    Past Medical History:   Diagnosis Date    Anxiety     Esophageal candidiasis (H)     Hot flashes     Migraine headache     Premature menopause        Past Surgical History:    Past Surgical History:   Procedure  "Laterality Date    HC KNEE SCOPE, DIAGNOSTIC      Description: Arthroscopy Knee Right;  Proc Date: 1995;       Family History:    Family History   Problem Relation Age of Onset    Breast Cancer Mother     Kidney Cancer Mother     Cancer Father         lung    Venous thrombosis Father         acute venous thrombosis of the lower extremities     Crohn's Disease Father     Factor V Leiden deficiency Father     Hyperlipidemia Brother        Social History:  Marital Status:   [2]  Social History     Tobacco Use    Smoking status: Former     Types: Cigarettes     Quit date: 1982     Years since quittin.7    Smokeless tobacco: Never   Vaping Use    Vaping Use: Never used        Medications:    terbinafine (LAMISIL) 250 MG tablet  CHOLECALCIFEROL, VITAMIN D3, (VITAMIN D3 ORAL)  LANsoprazole (PREVACID) 15 MG DR capsule  lisinopril (ZESTRIL) 10 MG tablet  OMEGA-3/DHA/EPA/FISH OIL (FISH OIL-OMEGA-3 FATTY ACIDS) 300-1,000 mg capsule  simvastatin (ZOCOR) 20 MG tablet          Review of Systems    Physical Exam   BP: (!) 161/78  Pulse: 86  Temp: 98.3  F (36.8  C)  Resp: 18  Height: 170.2 cm (5' 7\")  Weight: 75.8 kg (167 lb)  SpO2: 98 %      Physical Exam  Constitutional:       General: She is not in acute distress.     Appearance: Normal appearance. She is well-developed.   HENT:      Head: Normocephalic and atraumatic.   Eyes:      General: No scleral icterus.     Conjunctiva/sclera: Conjunctivae normal.   Cardiovascular:      Rate and Rhythm: Normal rate.   Pulmonary:      Effort: Pulmonary effort is normal. No respiratory distress.   Abdominal:      General: Abdomen is flat.   Musculoskeletal:      Cervical back: Normal range of motion and neck supple.   Skin:     General: Skin is warm and dry.      Findings: No rash.      Comments: Distal aspect of the nails appears to be slightly lifted with a slightly macerated appearance of the distal nailbeds.  Some mild discoloration of various nails of the hand and " the distal aspects as well.   Neurological:      Mental Status: She is alert and oriented to person, place, and time.       See HPI for additional details  ED Course                 Procedures         No results found for this or any previous visit (from the past 24 hour(s)).    Medications - No data to display    Assessments & Plan (with Medical Decision Making)   Patient presents to urgent care for concern of fingernail problem.  She is afebrile on arrival and vital signs are otherwise reassuring.  History and exam as above.  This is consistent with a fungal infection of the fingernails of the bilateral hands.  No concern for paronychia or cellulitic infection at this time.  I am not concerned for a subungual hematoma. Terbinafine by mouth for 6 weeks given the extent into the fingernail beds.  Return precautions were reviewed.  Advised patient to follow-up with primary care or dermatology for ongoing symptoms not relieved after treatment of the terbinafine.  Handouts were provided on discharge.  Patient was discharged in good condition and is agreeable to the above plan.    I have reviewed the nursing notes.    I have reviewed the findings, diagnosis, plan and need for follow up with the patient.        Discharge Medication List as of 9/23/2023 12:42 PM        START taking these medications    Details   terbinafine (LAMISIL) 250 MG tablet Take 1 tablet (250 mg) by mouth daily for 45 days, Disp-45 tablet, R-0, E-Prescribe             Final diagnoses:   Onychomycosis       9/23/2023   [facility] EMERGENCY DEPT    Disclaimer:  This note consists of symbols derived from keyboarding, dictation and/or voice recognition software.  As a result, there may be errors in the script that have gone undetected.  Please consider this when interpreting information found in this chart.         Obey Larson, DOMI CNP  09/23/23 [id number]

## 2023-09-23 NOTE — TELEPHONE ENCOUNTER
Pt reports getting pedicure/manicure done weekly, just this morning, while having nails done today, polish was removed this morning, noticed fungus underneath nails.  Pt is concerned about fungal infection, nail beds are black like bruising.    Triage to see HCP within 24 hours, care advice given. Pt states she is on her way to [facility].    Marlene Mckeon RN, BSN  9/23/2023 at 10:52 AM  [facility]    Reason for Disposition   Looks infected (spreading redness, pus)  (Exception: Localized redness and swelling of skin around nail.)    Additional Information   Negative: [1] Widespread rash AND [2] bright red, sunburn-like AND [3] too weak to stand   Negative: Sounds like a life-threatening emergency to the triager   Negative: Shock suspected (e.g., cold/pale/clammy skin, too weak to stand, low BP, rapid pulse)   Negative: Sounds like a life-threatening emergency to the triager   Negative: Dizziness or lightheadedness   Negative: [1] Swollen joint AND [2] fever   Negative: [1] Looks infected (spreading redness, red streak, pus) AND [2] fever   Negative: [1] Looks infected (spreading redness, red streak, pus) AND [2] severe pain with movement   Negative: Patient sounds very sick or weak to the triager   Negative: [1] Looks infected (spreading redness, red streak, pus) AND [2] large red area (> 2 inches or 5 cm, or entire finger)   Negative: [1] SEVERE pain (e.g., excruciating) AND [2] not improved after 2 hours of pain medicine   Negative: Yellow pus under skin around fingernail (cuticle) or pus under fingernail    Protocols used: Wound Infection Axtygbort-I-JA, Bruises-A-AH, Finger Pain-A-AH

## 2023-09-23 NOTE — DISCHARGE INSTRUCTIONS
Terbinafine for 6 weeks. Follow-up with primary care or dermatology if not improved after the 6 weeks. Return sooner for worsening or new concerns.

## 2023-10-29 ENCOUNTER — HEALTH MAINTENANCE LETTER (OUTPATIENT)
Age: 63
End: 2023-10-29

## 2023-11-09 ENCOUNTER — NURSE TRIAGE (OUTPATIENT)
Dept: FAMILY MEDICINE | Facility: CLINIC | Age: 63
End: 2023-11-09
Payer: COMMERCIAL

## 2023-11-09 NOTE — TELEPHONE ENCOUNTER
Spoke with patient to reaffirm plan for her to come in and be seen by PCP. Patient verbalized understanding of this place and will see PCP next week.     Sebas Meeks RN

## 2023-11-09 NOTE — TELEPHONE ENCOUNTER
If this has been going on for 2 years- and last years mammogram was negative- I would recommend patient come in to see one of us in clinic for evaluation and further workup. There could be nerve damage that is causing symptoms- but she should be seen in person.

## 2023-11-09 NOTE — TELEPHONE ENCOUNTER
Discomfort is up on the side coming up toward the nipple. Occasionally pain travels straight up nipple line.     Discomfort started two years ago, discomfort is more noticeable now. Chiropractic adjustment helped, but discomfort is still present.     No associated symptoms.     Discomfort is constant, nothing makes it better or worse.     Sebas Meeks RN

## 2023-11-09 NOTE — TELEPHONE ENCOUNTER
Where is the pain in the breast?  When did it start?  Is it associated with any other symptoms - rash, color changes, contour changes of the breast?  Does pain move? What makes it better, worse?

## 2023-11-09 NOTE — TELEPHONE ENCOUNTER
"Nurse Triage SBAR    Is this a 2nd Level Triage? NO    Situation: Patient called in and is having discomfort in her left breast. She states that she wouldn't describe it as pain, but that it is present and she is concerned.     Background: Patient is a 63 year old female. She previously had concerns about her breasts and learned that her rib was actually out of place. She has been seeing chiropractic for help in adjusting this. She reports that this has helped, but that she is still concerned that something may be different than previously.     Assessment: Patient should be seen by PCP since her situation has changed from what it was previously.     Protocol Recommended Disposition:   See in Office Within 3 Days    Recommendation: Patient should be seen by PCP. Would you like order imaging (U/S, mammogram)?      Routed to provider    Does the patient meet one of the following criteria for ADS visit consideration? No     Reason for Disposition   Patient wants to be seen    Additional Information   Negative: Chest pain   Negative: Breastfeeding questions about baby   Negative: Breastfeeding questions about mother (breast symptoms or feeling sick)   Negative: Breastfeeding questions about mother's medicines and diet   Negative: Postpartum breast pain and swelling, not breastfeeding   Negative: Small spot, skin growth or mole   Negative: SEVERE breast pain and fever > 103 F  (39.4 C)   Negative: Patient sounds very sick or weak to the triager   Negative: Breast looks infected (spreading redness, feels hot or painful to touch) and fever   Negative: Breast looks infected (spreading redness, feels hot or painful to touch) and no fever   Negative: Painful rash and multiple small blisters grouped together (i.e., dermatomal distribution or \"band\" or \"stripe\")   Negative: Cuts, burns, or bruises of breasts and suspicious history for the injury   Negative: Breast lump   Negative: Nipple discharge and bloody   Negative: Nipple " is inverted (i.e., points inward)  (Exception: Long-term physical characteristic, present for many years.)   Negative: Dry flaking-peeling skin of nipple   Negative: Change in shape or appearance of breast   Negative: Dimpling of skin of breast (i.e., skin looks like the outside of an orange peel)    Protocols used: Breast Symptoms-A-OH

## 2023-11-13 ENCOUNTER — HOSPITAL ENCOUNTER (OUTPATIENT)
Dept: BONE DENSITY | Facility: HOSPITAL | Age: 63
Discharge: HOME | End: 2023-11-13
Attending: INTERNAL MEDICINE | Admitting: INTERNAL MEDICINE
Payer: COMMERCIAL

## 2023-11-13 DIAGNOSIS — M81.0 OSTEOPOROSIS, POSTMENOPAUSAL: ICD-10-CM

## 2023-11-13 PROCEDURE — 77080 DXA BONE DENSITY AXIAL: CPT

## 2023-11-15 ENCOUNTER — OFFICE VISIT (OUTPATIENT)
Dept: FAMILY MEDICINE | Facility: CLINIC | Age: 63
End: 2023-11-15
Payer: COMMERCIAL

## 2023-11-15 VITALS
WEIGHT: 169 LBS | RESPIRATION RATE: 16 BRPM | DIASTOLIC BLOOD PRESSURE: 78 MMHG | TEMPERATURE: 98.3 F | HEART RATE: 82 BPM | SYSTOLIC BLOOD PRESSURE: 143 MMHG | OXYGEN SATURATION: 100 % | BODY MASS INDEX: 26.47 KG/M2

## 2023-11-15 DIAGNOSIS — S29.012A STRAIN OF THORACIC BACK REGION: Primary | ICD-10-CM

## 2023-11-15 PROCEDURE — 99213 OFFICE O/P EST LOW 20 MIN: CPT | Performed by: FAMILY MEDICINE

## 2023-11-15 NOTE — PROGRESS NOTES
Assessment & Plan     Strain of thoracic back region  Suspect irritation in the thoracic region causing nerve symptoms in the axilla and breast  Ultrasound and mammogram in the past has been negative  Patient is been seeing a chiropractor with improvement with adjustments of the thoracic region  She is going to be participating in physical therapy in the upcoming weeks  Discussed with her if not improving consideration for CT as she is severely claustrophobic and would not tolerate MRI  She is not in discomfort off to consider medication does want to make sure that we do not think that it is breast related        Rohan Nichols MD  [facility]    Nate Olsen is a 63 year old, presenting for the following health issues:  Rib Pain (Posterior left rib popping out and causing breast discomfort, chiropractor helps)        11/15/2023     8:57 AM   Additional Questions   Roomed by Myra PARIKH CMA   Patient here for abnormal sensation in the axilla and left breast region  Patient is been having similar symptoms for last couple years has been had imaging completed showing no breast involvement with ultrasound or mammogram  She has been seeing a chiropractor recently who has been adjusting her thoracic region which has been alleviating her symptoms but shortly after symptoms will return  Suspect area of the thoracic region with osteoarthritis or muscular irritation due to posturing causing symptoms-she has PT planned for the future we will monitor if symptoms are not improving or if they are worsening consideration for imaging of the thoracic region due to suspicion of thoracic strain or possible OA    History of Present Illness       Reason for visit:  Left Breast Ache    She eats 2-3 servings of fruits and vegetables daily.She consumes 1 sweetened beverage(s) daily.She exercises with enough effort to increase her heart rate 9 or less minutes per day.  She exercises with enough effort  to increase her heart rate 5 days per week.   She is taking medications regularly.                 Review of Systems         Objective    Wt 76.7 kg (169 lb)   LMP  (LMP Unknown)   BMI 26.47 kg/m    Body mass index is 26.47 kg/m .  Physical Exam   GENERAL: healthy, alert and no distress  RESP: lungs clear to auscultation - no rales, rhonchi or wheezes  CV: regular rate and rhythm, normal S1 S2, no S3 or S4, no murmur, click or rub, no peripheral edema and peripheral pulses strong  MS: no gross musculoskeletal defects noted, no edema

## 2023-11-16 ENCOUNTER — THERAPY VISIT (OUTPATIENT)
Dept: PHYSICAL THERAPY | Facility: REHABILITATION | Age: 63
End: 2023-11-16
Attending: FAMILY MEDICINE
Payer: COMMERCIAL

## 2023-11-16 DIAGNOSIS — G89.29 CHRONIC LEFT-SIDED THORACIC BACK PAIN: ICD-10-CM

## 2023-11-16 DIAGNOSIS — M54.6 CHRONIC LEFT-SIDED THORACIC BACK PAIN: ICD-10-CM

## 2023-11-16 PROCEDURE — 97535 SELF CARE MNGMENT TRAINING: CPT | Mod: GP

## 2023-11-16 PROCEDURE — 97161 PT EVAL LOW COMPLEX 20 MIN: CPT | Mod: GP

## 2023-11-16 NOTE — PROGRESS NOTES
"PHYSICAL THERAPY EVALUATION  Type of Visit: Evaluation    See electronic medical record for Abuse and Falls Screening details.    Subjective       Presenting condition or subjective complaint: posture correction / exercises  Pt reports had L chest and mid back pain that started about 2 years prior after mammogram and has been following up with chiropractor for \"resetting\" and adjusting rib into place. Has been going on for about 2 years on and off and has progressed to more consistent discomfort all throughout the day. Is hoping to recieve some postural exercises and chest stretches prior to going to [place] for the winter.   Date of onset: 06/16/23    Relevant medical history:     Dates & types of surgery: 1996 knee, 2014 hiatal hernia    Prior diagnostic imaging/testing results:       Prior therapy history for the same diagnosis, illness or injury: No      Prior Level of Function  Transfers: Independent  Ambulation: Independent  ADL: Independent  IADL:  IND    Living Environment  Social support: With a significant other or spouse   Type of home: House   Stairs to enter the home: Yes 12 Is there a railing: Yes   Ramp: No   Stairs inside the home: Yes 12 Is there a railing: Yes   Help at home: None  Equipment owned:       Employment: Not Applicable    Hobbies/Interests:      Patient goals for therapy:      Pain assessment: None      Objective   CERVICAL SPINE EVALUATION  PAIN: Pain Level at Rest: 0/10  Pain Level with Use: 0/10  Pain Location: L axilla wrapping to chest   Pain Quality: Aching and Dull  Pain Frequency: constant  Pain is Worst: same all the time   Pain is Exacerbated By: Constantly there  Pain is Relieved By: Chiropractor visits are temporary  Pain Progression: Unchanged  INTEGUMENTARY (edema, incisions): WNL  POSTURE: Standing Posture: Rounded shoulders, Forward head, Thoracic kyphosis increased  Sitting Posture: Rounded shoulders, Forward head, Thoracic kyphosis increased  ROM:   (Degrees) Left AROM " Right AROM    Cervical Flexion WNL     Cervical Extension WNL     Cervical Side bend WFL B, tightness to L SB WFL    Cervical Rotation      Cervical Protrusion     Cervical Retraction     Thoracic Flexion WFL     Thoracic Extension WFL     Thoracic Rotation WFL  WFL      Left AROM Left PROM Right AROM Right PROM   Shoulder Flexion       Shoulder Extension       Shoulder Abduction       Shoulder Adduction       Shoulder IR       Shoulder ER       Shoulder Horiz Abduction       Shoulder Horiz Adduction       Pain: All shoulder, neck and thoracic ROM WFL, tightness in midback with L sided thoracic TB and rotation. No pain  End Feel:     MYOTOMES: WNL  DTR S:   CORD SIGNS:   DERMATOMES:   NEURAL TENSION:   FLEXIBILITY: WNL   SPECIAL TESTS:  Negative   PALPATION:  TTP at Rib 6-7 and T7. TTP at L serratus and pec  SPINAL SEGMENTAL CONCLUSIONS:  Hypomobile       Assessment & Plan   CLINICAL IMPRESSIONS  Medical Diagnosis: M54.6, G89.29 (ICD-10-CM) - Chronic left-sided thoracic back pain    Treatment Diagnosis: Midback pain   Impression/Assessment: Patient is a 63 year old female with Thoracic back pain complaints.  The following significant findings have been identified: Pain, Decreased ROM/flexibility, Decreased joint mobility, Decreased strength, Impaired sensation, Inflammation, Impaired gait, Impaired muscle performance, Decreased activity tolerance, Impaired posture, and Instability. These impairments interfere with their ability to perform self care tasks, work tasks, recreational activities, household chores, driving , household mobility, and community mobility as compared to previous level of function. Pt presents with chronic L thoracic and chest wall discomfort starting after mammorgram. Discomfort has been present with all activity though not limited by pain. Has been regularly attending chiropractic appointments for adjustments of spine and ribs which significantly help with temporary relief. Upon eval findings  post suggestive of muscle imbalances secondary to postural mm weakness. Pt would benefit from couple sessions of PT to return to function prior to leaving to [place] for winter.     Clinical Decision Making (Complexity):  Clinical Presentation: Stable/Uncomplicated  Clinical Presentation Rationale: based on medical and personal factors listed in PT evaluation  Clinical Decision Making (Complexity): Low complexity    PLAN OF CARE  Treatment Interventions:  Modalities: E-stim, Iontophoresis, Mechanical Traction, Ultrasound  Interventions: Gait Training, Manual Therapy, Neuromuscular Re-education, Therapeutic Activity, Therapeutic Exercise, Self-Care/Home Management    Long Term Goals     PT Goal 1  Goal Identifier: HEP  Goal Description: Patient will be independent in self-management of condition and HEP to reach functional goals.  Target Date: 02/08/24  PT Goal 2  Goal Identifier: Exercise  Goal Description: Pt will be able to return to all level of activity and exercise without pain  Target Date: 02/08/24      Frequency of Treatment: 1x/wk  Duration of Treatment: 12 weeks    Recommended Referrals to Other Professionals:   Education Assessment:   Learner/Method: Patient  Education Comments: Verbalizes understanding    Risks and benefits of evaluation/treatment have been explained.   Patient/Family/caregiver agrees with Plan of Care.     Evaluation Time:     PT Eval, Low Complexity Minutes (58884): 20       Signing Clinician: BASIL MICHEL, PT      [facility]                                                                                   OUTPATIENT PHYSICAL THERAPY      PLAN OF TREATMENT FOR OUTPATIENT REHABILITATION   Patient's Last Name, First Name, Pretty Danielle YOB: 1960   Provider's Name   [facility]   Medical Record No.  [id number]     Onset Date: 06/16/23  Start of Care Date: 11/16/23     Medical Diagnosis:  M54.6,  G89.29 (ICD-10-CM) - Chronic left-sided thoracic back pain      PT Treatment Diagnosis:  Midback pain Plan of Treatment  Frequency/Duration: 1x/wk/ 12 weeks    Certification date from 11/16/23 to 02/08/24         See note for plan of treatment details and functional goals     BASIL MICHEL, PT                         I CERTIFY THE NEED FOR THESE SERVICES FURNISHED UNDER        THIS PLAN OF TREATMENT AND WHILE UNDER MY CARE     (Physician attestation of this document indicates review and certification of the therapy plan).              Referring Provider:  Rohan Nichols    Initial Assessment  See Epic Evaluation- Start of Care Date: 11/16/23

## 2023-11-21 ENCOUNTER — PATIENT OUTREACH (OUTPATIENT)
Dept: CARE COORDINATION | Facility: CLINIC | Age: 63
End: 2023-11-21
Payer: COMMERCIAL

## 2023-12-19 ENCOUNTER — PATIENT OUTREACH (OUTPATIENT)
Dept: CARE COORDINATION | Facility: CLINIC | Age: 63
End: 2023-12-19
Payer: COMMERCIAL

## 2023-12-26 NOTE — PROGRESS NOTES
"    DISCHARGE  Reason for Discharge: Patient has failed to schedule further appointments.    Equipment Issued: none    Discharge Plan: Patient to continue home program.    Referring Provider:  Rohan Nichols     11/16/23 0500   Appointment Info   Signing clinician's name / credentials Shelley Beck, PT, DPT   Total/Authorized Visits 12   Visits Used 1   Medical Diagnosis M54.6, G89.29 (ICD-10-CM) - Chronic left-sided thoracic back pain   PT Tx Diagnosis Midback pain   Precautions/Limitations Osteopenia   Quick Adds Certification   Progress Note/Certification   Start of Care Date 11/16/23   Onset of illness/injury or Date of Surgery 06/16/23   Therapy Frequency 1x/wk   Predicted Duration 12 weeks   Certification date from 11/16/23   Certification date to 02/08/24   GOALS   PT Goals 2;3;4   PT Goal 1   Goal Identifier HEP   Goal Description Patient will be independent in self-management of condition and HEP to reach functional goals.   Target Date 02/08/24   PT Goal 2   Goal Identifier Exercise   Goal Description Pt will be able to return to all level of activity and exercise without pain   Target Date 02/08/24   Subjective Report   Subjective Report Pt reports had L chest and mid back pain that started about 2 years prior after mammogram and has been following up with chiropractor for \"resetting\" and adjusting rib into place. Has been going on for about 2 years on and off and has progressed to more consistent discomfort all throughout the day. Is hoping to recieve some postural exercises and chest stretches prior to going to [place] for the winter.   Objective Measures   Objective Measures Objective Measure 1;Objective Measure 2;Objective Measure 3   Treatment Interventions (PT)   Interventions Therapeutic Procedure/Exercise;Manual Therapy;Self Care/Home Management   Therapeutic Procedure/Exercise   Therapeutic Procedures: strength, endurance, ROM, flexibillity minutes (88430) 10   Ther Proc 1 - Details Instructed in " HEP - thread the needle, serratus wall push up, counter push ups, low rows and pulldowns with green TB x 8 each exercise. Child pose with L and R side leans 10'' B. Added HEP to homescreen.   Skilled Intervention Updated HEP, Patient education, Verbal and tactile cues utilized to facilitate correct exercise technique   Patient Response/Progress Tolerated well, verbalizes understanding   Manual Therapy   Skilled Intervention STM, joint mobilizations   Self Care/home Management   ADL/Home Mgmt Training (17680) 83   Self Care 1 - Details Educated pt on benefit of exercises and postural strengthening. Time spent discussing model of spine, ribs and muscles attached using images. Additionally fileded questions on posture and upright standing and sitting. Educated pt on management of symptoms with active approach in addition to adjustments with chiroproactor visits.   Skilled Intervention Education   Patient Response/Progress Verbalizes understanding   Eval/Assessments   PT Eval, Low Complexity Minutes (65683) 74   Education   Learner/Method Patient   Education Comments Verbalizes understanding   Plan   Home program PTRx (phone) - thread needle, child pose, low row, pulldowns, serratus push ups   Plan for next session Review HEP - pec stretch, thoracic ext and roam roller. PAs and postural strength   Comments   Comments Pt presents with chronic L thoracic and chest wall discomfort starting after mammorgram. Discomfort has been present with all activity though not limited by pain. Has been regularly attending chiropractic appointments for adjustments of spine and ribs which significantly help with temporary relief. Upon eval findings post suggestive of muscle imbalances secondary to postural mm weakness. Pt would benefit from couple sessions of PT to return to function prior to leaving to [place] for winter.   Total Session Time   Timed Code Treatment Minutes 20   Total Treatment Time (sum of timed and untimed services) 40      ANAND, VO, PT

## 2024-02-20 ENCOUNTER — ANCILLARY PROCEDURE (OUTPATIENT)
Dept: MAMMOGRAPHY | Facility: CLINIC | Age: 64
End: 2024-02-20
Attending: FAMILY MEDICINE
Payer: COMMERCIAL

## 2024-02-20 DIAGNOSIS — Z12.31 VISIT FOR SCREENING MAMMOGRAM: ICD-10-CM

## 2024-02-20 PROCEDURE — 77063 BREAST TOMOSYNTHESIS BI: CPT

## 2024-04-01 DIAGNOSIS — I10 ESSENTIAL HYPERTENSION, BENIGN: ICD-10-CM

## 2024-04-01 DIAGNOSIS — K21.00 GASTROESOPHAGEAL REFLUX DISEASE WITH ESOPHAGITIS WITHOUT HEMORRHAGE: ICD-10-CM

## 2024-04-01 DIAGNOSIS — E78.2 MIXED HYPERLIPIDEMIA: ICD-10-CM

## 2024-04-01 RX ORDER — MECOBALAMIN 5000 MCG
TABLET,DISINTEGRATING ORAL
Qty: 180 CAPSULE | Refills: 3 | Status: SHIPPED | OUTPATIENT
Start: 2024-04-01

## 2024-04-01 RX ORDER — SIMVASTATIN 20 MG
20 TABLET ORAL AT BEDTIME
Qty: 90 TABLET | Refills: 0 | Status: SHIPPED | OUTPATIENT
Start: 2024-04-01 | End: 2024-06-24

## 2024-04-01 RX ORDER — LISINOPRIL 10 MG/1
TABLET ORAL
Qty: 90 TABLET | Refills: 0 | Status: SHIPPED | OUTPATIENT
Start: 2024-04-01 | End: 2024-06-24

## 2024-05-08 ENCOUNTER — TRANSFERRED RECORDS (OUTPATIENT)
Dept: HEALTH INFORMATION MANAGEMENT | Facility: CLINIC | Age: 64
End: 2024-05-08
Payer: COMMERCIAL

## 2024-06-24 DIAGNOSIS — E78.2 MIXED HYPERLIPIDEMIA: ICD-10-CM

## 2024-06-24 DIAGNOSIS — I10 ESSENTIAL HYPERTENSION, BENIGN: ICD-10-CM

## 2024-06-24 RX ORDER — SIMVASTATIN 20 MG
20 TABLET ORAL AT BEDTIME
Qty: 90 TABLET | Refills: 0 | Status: SHIPPED | OUTPATIENT
Start: 2024-06-24 | End: 2024-09-19

## 2024-06-24 RX ORDER — LISINOPRIL 10 MG/1
TABLET ORAL
Qty: 90 TABLET | Refills: 0 | Status: SHIPPED | OUTPATIENT
Start: 2024-06-24 | End: 2024-09-19

## 2024-08-08 ENCOUNTER — PATIENT OUTREACH (OUTPATIENT)
Dept: CARE COORDINATION | Facility: CLINIC | Age: 64
End: 2024-08-08
Payer: COMMERCIAL

## 2024-09-17 DIAGNOSIS — I10 ESSENTIAL HYPERTENSION, BENIGN: ICD-10-CM

## 2024-09-17 DIAGNOSIS — E78.2 MIXED HYPERLIPIDEMIA: ICD-10-CM

## 2024-09-19 RX ORDER — SIMVASTATIN 20 MG
20 TABLET ORAL AT BEDTIME
Qty: 90 TABLET | Refills: 0 | Status: SHIPPED | OUTPATIENT
Start: 2024-09-19 | End: 2024-10-01

## 2024-09-19 RX ORDER — LISINOPRIL 10 MG/1
TABLET ORAL
Qty: 90 TABLET | Refills: 0 | Status: SHIPPED | OUTPATIENT
Start: 2024-09-19 | End: 2024-10-01

## 2024-09-23 ENCOUNTER — MYC REFILL (OUTPATIENT)
Dept: FAMILY MEDICINE | Facility: CLINIC | Age: 64
End: 2024-09-23

## 2024-09-23 DIAGNOSIS — E78.2 MIXED HYPERLIPIDEMIA: ICD-10-CM

## 2024-09-23 DIAGNOSIS — I10 ESSENTIAL HYPERTENSION, BENIGN: ICD-10-CM

## 2024-09-23 DIAGNOSIS — K21.00 GASTROESOPHAGEAL REFLUX DISEASE WITH ESOPHAGITIS WITHOUT HEMORRHAGE: ICD-10-CM

## 2024-09-23 RX ORDER — MECOBALAMIN 5000 MCG
15 TABLET,DISINTEGRATING ORAL 2 TIMES DAILY
Qty: 180 CAPSULE | Refills: 3 | OUTPATIENT
Start: 2024-09-23

## 2024-09-23 RX ORDER — LISINOPRIL 10 MG/1
10 TABLET ORAL DAILY
Qty: 90 TABLET | Refills: 0 | OUTPATIENT
Start: 2024-09-23

## 2024-09-23 RX ORDER — SIMVASTATIN 20 MG
20 TABLET ORAL AT BEDTIME
Qty: 90 TABLET | Refills: 0 | OUTPATIENT
Start: 2024-09-23

## 2024-10-01 ENCOUNTER — OFFICE VISIT (OUTPATIENT)
Dept: FAMILY MEDICINE | Facility: CLINIC | Age: 64
End: 2024-10-01
Payer: COMMERCIAL

## 2024-10-01 VITALS
DIASTOLIC BLOOD PRESSURE: 76 MMHG | HEIGHT: 67 IN | RESPIRATION RATE: 16 BRPM | SYSTOLIC BLOOD PRESSURE: 148 MMHG | WEIGHT: 173 LBS | HEART RATE: 74 BPM | BODY MASS INDEX: 27.15 KG/M2 | TEMPERATURE: 98 F | OXYGEN SATURATION: 96 %

## 2024-10-01 DIAGNOSIS — Z00.00 HEALTH CARE MAINTENANCE: ICD-10-CM

## 2024-10-01 DIAGNOSIS — I10 ESSENTIAL HYPERTENSION, BENIGN: Primary | ICD-10-CM

## 2024-10-01 DIAGNOSIS — E78.2 MIXED HYPERLIPIDEMIA: ICD-10-CM

## 2024-10-01 LAB
ALBUMIN SERPL BCG-MCNC: 4.4 G/DL (ref 3.5–5.2)
ALP SERPL-CCNC: 74 U/L (ref 40–150)
ALT SERPL W P-5'-P-CCNC: 32 U/L (ref 0–50)
ANION GAP SERPL CALCULATED.3IONS-SCNC: 11 MMOL/L (ref 7–15)
AST SERPL W P-5'-P-CCNC: 26 U/L (ref 0–45)
BILIRUB SERPL-MCNC: 0.4 MG/DL
BUN SERPL-MCNC: 17.4 MG/DL (ref 8–23)
CALCIUM SERPL-MCNC: 9.1 MG/DL (ref 8.8–10.4)
CHLORIDE SERPL-SCNC: 102 MMOL/L (ref 98–107)
CHOLEST SERPL-MCNC: 282 MG/DL
CREAT SERPL-MCNC: 0.75 MG/DL (ref 0.51–0.95)
EGFRCR SERPLBLD CKD-EPI 2021: 87 ML/MIN/1.73M2
ERYTHROCYTE [DISTWIDTH] IN BLOOD BY AUTOMATED COUNT: 12.4 % (ref 10–15)
FASTING STATUS PATIENT QL REPORTED: YES
FASTING STATUS PATIENT QL REPORTED: YES
GLUCOSE SERPL-MCNC: 109 MG/DL (ref 70–99)
HCO3 SERPL-SCNC: 26 MMOL/L (ref 22–29)
HCT VFR BLD AUTO: 40.8 % (ref 35–47)
HDLC SERPL-MCNC: 62 MG/DL
HGB BLD-MCNC: 13.5 G/DL (ref 11.7–15.7)
LDLC SERPL CALC-MCNC: 195 MG/DL
MCH RBC QN AUTO: 30.3 PG (ref 26.5–33)
MCHC RBC AUTO-ENTMCNC: 33.1 G/DL (ref 31.5–36.5)
MCV RBC AUTO: 92 FL (ref 78–100)
NONHDLC SERPL-MCNC: 220 MG/DL
PLATELET # BLD AUTO: 235 10E3/UL (ref 150–450)
POTASSIUM SERPL-SCNC: 4.4 MMOL/L (ref 3.4–5.3)
PROT SERPL-MCNC: 7.6 G/DL (ref 6.4–8.3)
RBC # BLD AUTO: 4.45 10E6/UL (ref 3.8–5.2)
SODIUM SERPL-SCNC: 139 MMOL/L (ref 135–145)
TRIGL SERPL-MCNC: 123 MG/DL
WBC # BLD AUTO: 5.8 10E3/UL (ref 4–11)

## 2024-10-01 PROCEDURE — 80061 LIPID PANEL: CPT | Performed by: FAMILY MEDICINE

## 2024-10-01 PROCEDURE — 99214 OFFICE O/P EST MOD 30 MIN: CPT | Mod: 25 | Performed by: FAMILY MEDICINE

## 2024-10-01 PROCEDURE — 80053 COMPREHEN METABOLIC PANEL: CPT | Performed by: FAMILY MEDICINE

## 2024-10-01 PROCEDURE — 85027 COMPLETE CBC AUTOMATED: CPT | Performed by: FAMILY MEDICINE

## 2024-10-01 PROCEDURE — G0438 PPPS, INITIAL VISIT: HCPCS | Performed by: FAMILY MEDICINE

## 2024-10-01 PROCEDURE — 36415 COLL VENOUS BLD VENIPUNCTURE: CPT | Performed by: FAMILY MEDICINE

## 2024-10-01 RX ORDER — UBIDECARENONE 50 MG
600 CAPSULE ORAL DAILY
Qty: 90 TABLET | Refills: 3 | Status: SHIPPED | OUTPATIENT
Start: 2024-10-01

## 2024-10-01 RX ORDER — LISINOPRIL 10 MG/1
10 TABLET ORAL DAILY
Qty: 90 TABLET | Refills: 3 | Status: SHIPPED | OUTPATIENT
Start: 2024-10-01

## 2024-10-01 SDOH — HEALTH STABILITY: PHYSICAL HEALTH: ON AVERAGE, HOW MANY DAYS PER WEEK DO YOU ENGAGE IN MODERATE TO STRENUOUS EXERCISE (LIKE A BRISK WALK)?: 4 DAYS

## 2024-10-01 ASSESSMENT — SOCIAL DETERMINANTS OF HEALTH (SDOH): HOW OFTEN DO YOU GET TOGETHER WITH FRIENDS OR RELATIVES?: MORE THAN THREE TIMES A WEEK

## 2024-10-01 NOTE — PROGRESS NOTES
"Preventive Care Visit  [facility]  Rohan Nichols MD, Family Medicine  Oct 1, 2024      Assessment & Plan     Health care maintenance  Here for annual exam  Interested in fasting blood work  Declines vaccines today  Up-to-date on mammogram and colonoscopy  - Comprehensive metabolic panel  - CBC with platelets  - Red Yeast Rice 600 MG TABS  Dispense: 90 tablet; Refill: 3  - Comprehensive metabolic panel  - CBC with platelets    Essential hypertension, benign  Blood pressure slightly elevated she like to monitor at home prior to making any dosing adjustments will continue with the 10 mg lisinopril at this time check electrolytes and kidney function  - lisinopril (ZESTRIL) 10 MG tablet  Dispense: 90 tablet; Refill: 3    Mixed hyperlipidemia  Patient has elevated cholesterol in the past she has been hesitant about starting statin medications due to possible long-term effects with memory  Discussed different options including Zetia and red rice yeast extract she is willing to try red rice  - Lipid panel reflex to direct LDL Fasting  - Red Yeast Rice 600 MG TABS  Dispense: 90 tablet; Refill: 3  - Lipid panel reflex to direct LDL Fasting      Patient has been advised of split billing requirements and indicates understanding: Yes        BMI  Estimated body mass index is 27.44 kg/m  as calculated from the following:    Height as of this encounter: 1.691 m (5' 6.58\").    Weight as of this encounter: 78.5 kg (173 lb).   Weight management plan: Discussed healthy diet and exercise guidelines    Counseling  Appropriate preventive services were addressed with this patient via screening, questionnaire, or discussion as appropriate for fall prevention, nutrition, physical activity, Tobacco-use cessation, social engagement, weight loss and cognition.  Checklist reviewing preventive services available has been given to the patient.  Reviewed patient's diet, addressing concerns and/or questions.   The " patient reports drinking more than 3 alcoholic drinks per day and/or more than 7 drhnks per week. The patient was counseled and given information about possible harmful effects of excessive alcohol intake.        Nate Olsen is a 64 year old, presenting for the following:  Wellness Visit (Fasting labs )        10/1/2024     7:12 AM   Additional Questions   Roomed by Myra PARIKH CMA         Health Care Directive  Patient does not have a Health Care Directive or Living Will: Discussed advance care planning with patient; however, patient declined at this time.     HPI  Patient here for annual exam  Follow with dermatology but looking for a new dermatologist.  Patient is on lisinopril for blood pressure blood pressure slightly elevated she like to monitor prior to making dosing adjustments.  She did not start statin medication over concerns over long-term use and memory-discussed different options and she is willing to trial on red rice yeast extract.  Recommend rechecking cholesterol levels in 3 to 4 months.  Declines immunizations today.  Up-to-date on colon cancer screening and mammogram.  Awaiting upcoming DEXA scan she is on Reclast with endocrinology.          10/1/2024   General Health   How would you rate your overall physical health? Good   Feel stress (tense, anxious, or unable to sleep) Rather much      (!) STRESS CONCERN      10/1/2024   Nutrition   Diet: Regular (no restrictions)            10/1/2024   Exercise   Days per week of moderate/strenous exercise 4 days            10/1/2024   Social Factors   Frequency of gathering with friends or relatives More than three times a week   Worry food won't last until get money to buy more Patient declined   Food not last or not have enough money for food? Patient declined   Do you have housing? (Housing is defined as stable permanent housing and does not include staying ouside in a car, in a tent, in an abandoned building, in an overnight shelter, or  couch-surfing.) Patient declined   Are you worried about losing your housing? Patient declined   Lack of transportation? Patient declined   Unable to get utilities (heat,electricity)? Patient declined            10/1/2024   Fall Risk   Fallen 2 or more times in the past year? No   Trouble with walking or balance? No             10/1/2024   Activities of Daily Living- Home Safety   Needs help with the following daily activites None of the above   Safety concerns in the home None of the above            10/1/2024   Dental   Dentist two times every year? Yes            10/1/2024   Hearing Screening   Hearing concerns? None of the above            10/1/2024   Driving Risk Screening   Patient/family members have concerns about driving No            10/1/2024   General Alertness/Fatigue Screening   Have you been more tired than usual lately? No            10/1/2024   Urinary Incontinence Screening   Bothered by leaking urine in past 6 months No            10/1/2024   TB Screening   Were you born outside of the [place]? No            Today's PHQ-2 Score:       10/1/2024     7:08 AM   PHQ-2 ( 1999 [employer])   Q1: Little interest or pleasure in doing things 0   Q2: Feeling down, depressed or hopeless 0   PHQ-2 Score 0   Q1: Little interest or pleasure in doing things Not at all   Q2: Feeling down, depressed or hopeless Not at all   PHQ-2 Score 0           10/1/2024   Substance Use   Alcohol more than 3/day or more than 7/wk Yes   How often do you have a drink containing alcohol 4 or more times a week   How many alcohol drinks on typical day 1 or 2   How often do you have 5+ drinks at one occasion Never   Audit 2/3 Score 0   How often not able to stop drinking once started Never   How often failed to do what normally expected Never   How often needed first drink in am after a heavy drinking session Never   How often feeling of guilt or remorse after drinking Never   How often unable to remember what happened the night before Never    Have you or someone else been injured because of your drinking No   Has anyone been concerned or suggested you cut down on drinking No   TOTAL SCORE - AUDIT 4   Do you have a current opioid prescription? No   How severe/bad is pain from 1 to 10? 0/10 (No Pain)   Do you use any other substances recreationally? No        Social History     Tobacco Use    Smoking status: Former     Current packs/day: 0.00     Types: Cigarettes     Quit date: 1982     Years since quittin.7    Smokeless tobacco: Never   Vaping Use    Vaping status: Never Used             10/1/2024   Breast Cancer Screening   Family history of breast, colon, or ovarian cancer? Yes          10/1/2024   LAST FHS-7 RESULTS   1st degree relative breast or ovarian cancer Yes   Any relative bilateral breast cancer No   Any male have breast cancer No   Any ONE woman have BOTH breast AND ovarian cancer No   Any woman with breast cancer before 50yrs No   2 or more relatives with breast AND/OR ovarian cancer Yes   2 or more relatives with breast AND/OR bowel cancer No           Recommend yearly mammogram           ASCVD Risk   The 10-year ASCVD risk score (Olivia BARRAGAN, et al., 2019) is: 11.1%    Values used to calculate the score:      Age: 64 years      Sex: Female      Is Non- : No      Diabetic: No      Tobacco smoker: No      Systolic Blood Pressure: 140 mmHg      Is BP treated: Yes      HDL Cholesterol: 63 mg/dL      Total Cholesterol: 289 mg/dL            Reviewed and updated as needed this visit by Provider                      Current providers sharing in care for this patient include:  Patient Care Team:  Rohan Nichols MD as PCP - General (Family Practice)  Rohan Nichols MD as Assigned PCP    The following health maintenance items are reviewed in Epic and correct as of today:  Health Maintenance   Topic Date Due    HEPATITIS C SCREENING  Never done    DTAP/TDAP/TD IMMUNIZATION (2 - Td or Tdap)  "10/23/2017    BMP  01/07/2022    LIPID  01/07/2022    MEDICARE ANNUAL WELLNESS VISIT  08/22/2023    Pneumococcal Vaccine: 65+ Years (1 of 1 - PCV) Never done    GLUCOSE  01/07/2024    ANNUAL REVIEW OF  ORDERS  04/28/2024    INFLUENZA VACCINE (1) 09/01/2024    COVID-19 Vaccine (3 - 2024-25 season) 09/01/2024    MAMMO SCREENING  02/20/2025    FALL RISK ASSESSMENT  10/01/2025    COLORECTAL CANCER SCREENING  12/20/2026    ADVANCE CARE PLANNING  10/01/2029    RSV VACCINE (1 - 1-dose 75+ series) 08/22/2033    DEXA  11/13/2038    PHQ-2 (once per calendar year)  Completed    ZOSTER IMMUNIZATION  Completed    HPV IMMUNIZATION  Aged Out    MENINGITIS IMMUNIZATION  Aged Out    RSV MONOCLONAL ANTIBODY  Aged Out            Objective    Exam  BP (!) 140/71 (BP Location: Left arm, Patient Position: Sitting, Cuff Size: Adult Regular)   Pulse 78   Temp 98  F (36.7  C) (Oral)   Resp 16   Ht 1.691 m (5' 6.58\")   Wt 78.5 kg (173 lb)   LMP  (LMP Unknown)   SpO2 96%   BMI 27.44 kg/m     Estimated body mass index is 27.44 kg/m  as calculated from the following:    Height as of this encounter: 1.691 m (5' 6.58\").    Weight as of this encounter: 78.5 kg (173 lb).    Physical Exam  GENERAL: alert and no distress  EYES: Eyes grossly normal to inspection, PERRL and conjunctivae and sclerae normal  HENT: ear canals and TM's normal, nose and mouth without ulcers or lesions  RESP: lungs clear to auscultation - no rales, rhonchi or wheezes  CV: regular rate and rhythm, normal S1 S2, no S3 or S4, no murmur, click or rub, no peripheral edema  ABDOMEN: soft, nontender, no hepatosplenomegaly, no masses and bowel sounds normal  MS: no gross musculoskeletal defects noted, no edema  NEURO: Normal strength and tone, mentation intact and speech normal  PSYCH: mentation appears normal, affect normal/bright         10/1/2024   Mini Cog   Clock Draw Score 2 Normal   3 Item Recall 3 objects recalled   Mini Cog Total Score 5                 Signed " Electronically by: Rohan Nichols MD

## 2024-11-14 ENCOUNTER — HOSPITAL ENCOUNTER (OUTPATIENT)
Dept: BONE DENSITY | Facility: HOSPITAL | Age: 64
Discharge: HOME | End: 2024-11-14
Attending: INTERNAL MEDICINE
Payer: COMMERCIAL

## 2024-11-14 DIAGNOSIS — M81.0 POST-MENOPAUSAL OSTEOPOROSIS: ICD-10-CM

## 2024-11-14 PROCEDURE — 77080 DXA BONE DENSITY AXIAL: CPT

## 2024-11-27 ENCOUNTER — TELEPHONE (OUTPATIENT)
Dept: FAMILY MEDICINE | Facility: CLINIC | Age: 64
End: 2024-11-27
Payer: COMMERCIAL

## 2024-11-27 NOTE — TELEPHONE ENCOUNTER
Would suggest e-visit for medication if needed for dental work- and information on what dental work is being done

## 2024-11-27 NOTE — TELEPHONE ENCOUNTER
General Call    Contacts       Contact Date/Time Type Contact Phone/Fax    11/27/2024 10:57 AM CST Phone (Incoming) [facility] (Self) [phone] (H)          Reason for Call:  patient stated she is having dental work done next Wednesday and was urged to call her PCP for something to relax her for this visit.   Pharmacy attached, please advise.   Patient does check [employer] if you want to message her back regarding this.     Could we send this information to you in [employer] or would you prefer to receive a phone call?:   Patient would prefer a phone call   Okay to leave a detailed message?: Yes at Cell number on file:    Telephone Information:   Mobile [phone]

## 2024-11-27 NOTE — TELEPHONE ENCOUNTER
Informed patient of message below.  Patient is unsure how to submit an e-visit and requested I send a [employer] message with instructions.

## 2024-12-02 ENCOUNTER — E-VISIT (OUTPATIENT)
Dept: FAMILY MEDICINE | Facility: CLINIC | Age: 64
End: 2024-12-02
Payer: COMMERCIAL

## 2024-12-02 DIAGNOSIS — F41.9 ANXIETY: Primary | ICD-10-CM

## 2024-12-02 RX ORDER — HYDROXYZINE HYDROCHLORIDE 25 MG/1
TABLET, FILM COATED ORAL
Qty: 4 TABLET | Refills: 0 | Status: SHIPPED | OUTPATIENT
Start: 2024-12-02

## 2024-12-23 DIAGNOSIS — E78.2 MIXED HYPERLIPIDEMIA: Primary | ICD-10-CM

## 2024-12-23 RX ORDER — SIMVASTATIN 20 MG
20 TABLET ORAL AT BEDTIME
Qty: 90 TABLET | Refills: 3 | Status: SHIPPED | OUTPATIENT
Start: 2024-12-23

## 2024-12-23 RX ORDER — SIMVASTATIN 20 MG
20 TABLET ORAL AT BEDTIME
COMMUNITY
Start: 2024-10-01 | End: 2024-12-23

## 2024-12-27 ENCOUNTER — TRANSFERRED RECORDS (OUTPATIENT)
Dept: HEALTH INFORMATION MANAGEMENT | Facility: CLINIC | Age: 64
End: 2024-12-27
Payer: COMMERCIAL

## 2025-01-15 DIAGNOSIS — E78.2 MIXED HYPERLIPIDEMIA: Primary | ICD-10-CM

## 2025-01-21 ENCOUNTER — PATIENT OUTREACH (OUTPATIENT)
Dept: CARE COORDINATION | Facility: CLINIC | Age: 65
End: 2025-01-21
Payer: COMMERCIAL

## 2025-02-18 ENCOUNTER — PATIENT OUTREACH (OUTPATIENT)
Dept: CARE COORDINATION | Facility: CLINIC | Age: 65
End: 2025-02-18
Payer: COMMERCIAL

## 2025-03-13 ENCOUNTER — ANCILLARY PROCEDURE (OUTPATIENT)
Dept: MAMMOGRAPHY | Facility: CLINIC | Age: 65
End: 2025-03-13
Attending: FAMILY MEDICINE
Payer: COMMERCIAL

## 2025-03-13 DIAGNOSIS — Z12.31 VISIT FOR SCREENING MAMMOGRAM: ICD-10-CM

## 2025-03-13 PROCEDURE — 77063 BREAST TOMOSYNTHESIS BI: CPT

## 2025-03-25 DIAGNOSIS — K21.00 GASTROESOPHAGEAL REFLUX DISEASE WITH ESOPHAGITIS WITHOUT HEMORRHAGE: ICD-10-CM

## 2025-03-25 RX ORDER — MECOBALAMIN 5000 MCG
15 TABLET,DISINTEGRATING ORAL 2 TIMES DAILY
Qty: 180 CAPSULE | Refills: 1 | Status: SHIPPED | OUTPATIENT
Start: 2025-03-25

## 2025-03-26 ENCOUNTER — MYC REFILL (OUTPATIENT)
Dept: FAMILY MEDICINE | Facility: CLINIC | Age: 65
End: 2025-03-26
Payer: COMMERCIAL

## 2025-03-26 DIAGNOSIS — K21.00 GASTROESOPHAGEAL REFLUX DISEASE WITH ESOPHAGITIS WITHOUT HEMORRHAGE: ICD-10-CM

## 2025-03-27 RX ORDER — MECOBALAMIN 5000 MCG
15 TABLET,DISINTEGRATING ORAL 2 TIMES DAILY
Qty: 180 CAPSULE | Refills: 1 | OUTPATIENT
Start: 2025-03-27